# Patient Record
Sex: FEMALE | Race: WHITE | NOT HISPANIC OR LATINO | URBAN - METROPOLITAN AREA
[De-identification: names, ages, dates, MRNs, and addresses within clinical notes are randomized per-mention and may not be internally consistent; named-entity substitution may affect disease eponyms.]

---

## 2019-01-20 ENCOUNTER — HOSPITAL ENCOUNTER (EMERGENCY)
Facility: HOSPITAL | Age: 34
End: 2019-01-20
Attending: STUDENT IN AN ORGANIZED HEALTH CARE EDUCATION/TRAINING PROGRAM
Payer: MEDICARE

## 2019-01-20 VITALS
RESPIRATION RATE: 15 BRPM | BODY MASS INDEX: 20.83 KG/M2 | WEIGHT: 125 LBS | SYSTOLIC BLOOD PRESSURE: 119 MMHG | TEMPERATURE: 98.1 F | HEIGHT: 65 IN | DIASTOLIC BLOOD PRESSURE: 71 MMHG | OXYGEN SATURATION: 99 % | HEART RATE: 70 BPM

## 2019-01-20 DIAGNOSIS — T14.91XA SUICIDAL BEHAVIOR WITH ATTEMPTED SELF-INJURY (CMS/HCC): Primary | ICD-10-CM

## 2019-01-20 DIAGNOSIS — R45.851 SUICIDAL IDEATION: ICD-10-CM

## 2019-01-20 LAB
AMPHET UR QL SCN: NORMAL
ANION GAP SERPL CALC-SCNC: 8 MEQ/L (ref 3–15)
APAP SERPL-MCNC: <10 UG/ML (ref 10–30)
BARBITURATES UR QL SCN: NORMAL
BASOPHILS # BLD: 0.05 K/UL (ref 0.01–0.1)
BASOPHILS NFR BLD: 0.8 %
BENZODIAZ UR QL SCN: NORMAL
BUN SERPL-MCNC: 9 MG/DL (ref 8–20)
CALCIUM SERPL-MCNC: 8.4 MG/DL (ref 8.9–10.3)
CANNABINOIDS UR QL SCN: NORMAL
CHLORIDE SERPL-SCNC: 107 MEQ/L (ref 98–109)
CO2 SERPL-SCNC: 23 MEQ/L (ref 22–32)
COCAINE UR QL SCN: NORMAL
CREAT SERPL-MCNC: 0.8 MG/DL
DIFFERENTIAL METHOD BLD: ABNORMAL
EOSINOPHIL # BLD: 0.73 K/UL (ref 0.04–0.36)
EOSINOPHIL NFR BLD: 12 %
ERYTHROCYTE [DISTWIDTH] IN BLOOD BY AUTOMATED COUNT: 11.8 % (ref 11.7–14.4)
ETHANOL SERPL-MCNC: <5 MG/DL
GFR SERPL CREATININE-BSD FRML MDRD: >60 ML/MIN/1.73M*2
GLUCOSE SERPL-MCNC: 93 MG/DL (ref 70–99)
HCG UR QL: NEGATIVE
HCT VFR BLDCO AUTO: 41.3 %
HGB BLD-MCNC: 13.8 G/DL
IMM GRANULOCYTES # BLD AUTO: 0.01 K/UL (ref 0–0.08)
IMM GRANULOCYTES NFR BLD AUTO: 0.2 %
LYMPHOCYTES # BLD: 2.65 K/UL (ref 1.2–3.5)
LYMPHOCYTES NFR BLD: 43.4 %
MCH RBC QN AUTO: 32.6 PG (ref 28–33.2)
MCHC RBC AUTO-ENTMCNC: 33.4 G/DL (ref 32.2–35.5)
MCV RBC AUTO: 97.6 FL (ref 83–98)
MONOCYTES # BLD: 0.43 K/UL (ref 0.28–0.8)
MONOCYTES NFR BLD: 7 %
NEUTROPHILS # BLD: 2.23 K/UL (ref 1.7–7)
NEUTS SEG NFR BLD: 36.6 %
NRBC BLD-RTO: 0 %
OPIATES UR QL SCN: NORMAL
PCP UR QL SCN: NORMAL
PDW BLD AUTO: 9.4 FL (ref 9.4–12.3)
PLATELET # BLD AUTO: 249 K/UL
POTASSIUM SERPL-SCNC: 3.9 MEQ/L (ref 3.6–5.1)
RBC # BLD AUTO: 4.23 M/UL (ref 3.93–5.22)
SALICYLATES SERPL-MCNC: <4 MG/DL
SODIUM SERPL-SCNC: 138 MEQ/L (ref 136–144)
WBC # BLD AUTO: 6.1 K/UL

## 2019-01-20 PROCEDURE — 99285 EMERGENCY DEPT VISIT HI MDM: CPT

## 2019-01-20 PROCEDURE — 36415 COLL VENOUS BLD VENIPUNCTURE: CPT | Performed by: PHYSICIAN ASSISTANT

## 2019-01-20 PROCEDURE — 85025 COMPLETE CBC W/AUTO DIFF WBC: CPT | Performed by: PHYSICIAN ASSISTANT

## 2019-01-20 PROCEDURE — G0480 DRUG TEST DEF 1-7 CLASSES: HCPCS | Performed by: PHYSICIAN ASSISTANT

## 2019-01-20 PROCEDURE — 80307 DRUG TEST PRSMV CHEM ANLYZR: CPT | Performed by: PHYSICIAN ASSISTANT

## 2019-01-20 PROCEDURE — 84703 CHORIONIC GONADOTROPIN ASSAY: CPT | Performed by: PHYSICIAN ASSISTANT

## 2019-01-20 PROCEDURE — 84295 ASSAY OF SERUM SODIUM: CPT | Performed by: PHYSICIAN ASSISTANT

## 2019-01-20 RX ORDER — ALBUTEROL SULFATE 90 UG/1
AEROSOL, METERED RESPIRATORY (INHALATION)
Refills: 6 | COMMUNITY
Start: 2018-12-23

## 2019-01-20 RX ORDER — CLONAZEPAM 0.5 MG/1
0.5 TABLET ORAL AS NEEDED
COMMUNITY

## 2019-01-20 ASSESSMENT — ENCOUNTER SYMPTOMS
FEVER: 0
DIARRHEA: 0
WEAKNESS: 0
COUGH: 0
SHORTNESS OF BREATH: 0
VOMITING: 0
SORE THROAT: 0
DIZZINESS: 1
NUMBNESS: 0
COLOR CHANGE: 0
ABDOMINAL PAIN: 0

## 2019-01-20 ASSESSMENT — COGNITIVE AND FUNCTIONAL STATUS - GENERAL
PSYCHOMOTOR FUNCTIONING: INCREASED
APPEARANCE: WELL GROOMED
AFFECT: LABILE
SPEECH: PRESSURED;VERBOSE;LOUD
CURRENT VIOLENT THOUGHTS OR BEHAVIOR: ACTIVE IDEATION;PLAN;MEANS
PERCEPTUAL FUNCTION: NORMAL
IMPULSE CONTROL: SPONTANEOUS
THOUGHT_CONTENT: GUARDED;SUSPICIOUS
ORIENTATION: FULLY ORIENTED
JUDGEMENT: IMPAIRED, SEVERELY
INSIGHT: IMPAIRED SEVERELY

## 2019-01-20 NOTE — ED ATTESTATION NOTE
I saw and evaluated the patient, participated in the management, and agree with the findings in the above note. We discussed the case and the treatment plan.       Bertram Ibrahim,   01/20/19 0340

## 2019-01-20 NOTE — ED PROVIDER NOTES
"HPI     Chief Complaint   Patient presents with   • Drug Overdose     Pt reports suicidal thoughts \"for a long time\". Today reports taking 20 Klonopin in attempt to kill herself. Took 10 @ 3pm; 10 @ 11pm. c/o dizziness.        32 yo female pmhx asthma, bipolar disorder, borderline personality disorder, presents to ED for evaluation of suicidal attempt. Pt states that she took 10 tabs of 0.5mg Klonopin at 3pm today. Pt fell asleep, and then woke up and took more. Pt states that she took 10-12 additional tabs of 0.5mg klonopin at 11pm prior to arrival to ED.     Pt states she has not been taking any of her psychiatric medications x1 year    Pt presents accompanied by her .     Pt has hx of suicidal attempt, as recently as in august 2018, when she attempted to kill herself by placing a plastic bag over her head.    Pt admits to recent increase ETOH intake, 1 bottle of wine a day.    Pt denies any other illicit drug or substance use or abuse.        History provided by:  Patient  Drug Overdose   Associated symptoms: no abdominal pain, no chest pain, no cough, no diarrhea, no fever, no shortness of breath, no sore throat and no vomiting         Patient History     Past Medical History:   Diagnosis Date   • Asthma    • Bipolar disorder (CMS/HCC) ([facility])    • Borderline personality disorder (CMS/HCC)    • Seasonal allergies        History reviewed. No pertinent surgical history.    History reviewed. No pertinent family history.    Social History   Substance Use Topics   • Smoking status: Never Smoker   • Smokeless tobacco: Not on file   • Alcohol use Yes      Comment: daily; 1 bottle of wine daily.        Systems Reviewed from Nursing Triage:          Review of Systems     Review of Systems   Constitutional: Negative for fever.   HENT: Negative for sore throat.    Respiratory: Negative for cough and shortness of breath.    Cardiovascular: Negative for chest pain.   Gastrointestinal: Negative for abdominal pain, " "diarrhea and vomiting.   Skin: Negative for color change.   Neurological: Positive for dizziness. Negative for weakness and numbness.   Psychiatric/Behavioral: Positive for self-injury and suicidal ideas.        Physical Exam     ED Triage Vitals [01/20/19 0046]   Temp Heart Rate Resp BP SpO2   37.2 °C (98.9 °F) 90 16 120/87 100 %      Temp Source Heart Rate Source Patient Position BP Location FiO2 (%) (Set)   Tympanic Monitor -- -- --                     Patient Vitals for the past 24 hrs:   BP Temp Temp src Pulse Resp SpO2 Height Weight   01/20/19 0046 120/87 37.2 °C (98.9 °F) Tympanic 90 16 100 % 1.651 m (5' 5\") 56.7 kg (125 lb)           Physical Exam   Constitutional: She appears well-developed and well-nourished. No distress.   HENT:   Head: Normocephalic and atraumatic.   Eyes: Conjunctivae are normal.   Neck: Neck supple.   Cardiovascular: Normal rate and regular rhythm.    No murmur heard.  Pulmonary/Chest: Effort normal and breath sounds normal. No respiratory distress.   Abdominal: Soft. There is no tenderness.   Musculoskeletal: She exhibits no edema.   Neurological: She is alert.   Skin: Skin is warm and dry.   Psychiatric: Her speech is normal and behavior is normal. Thought content is not paranoid. She exhibits a depressed mood. She expresses suicidal ideation. She expresses suicidal plans. She expresses no homicidal plans.   Nursing note and vitals reviewed.           Procedures    ED Course & MDM     Labs Reviewed   CBC AND DIFFERENTIAL    Narrative:     The following orders were created for panel order CBC and differential.  Procedure                               Abnormality         Status                     ---------                               -----------         ------                     CBC[[id number]]                                                                          Diff Count[[id number]]                                                                     Please view results for these " tests on the individual orders.   ER TOXICOLOGY SCR, SERUM   DRUG SCREEN PANEL, URINE   BHCG, SERUM, QUAL   BASIC METABOLIC PANEL   CBC   DIFF COUNT       No orders to display               MDM         ED Course as of Jan 20 1634   Sun Jan 20, 2019   0116 Discussed with poison control, agrees with plan and work up. Recommending observation in ED for 4-6 hours.  [NC]   0201 Preg Test, Serum: Negative [NC]   0348 Pt remains stable. AAOx3, GCS15. Pt seen by Joselito saha. Plan for 201 psychiatric admission. Pt agrees with plan.   [NS]      ED Course User Index  [NC] Alcides Martin PA C  [NS] Bertram Ibrahim E, DO         Clinical Impressions as of Jan 20 1634   Suicidal behavior with attempted self-injury (CMS/HCC)   Suicidal ideation        Alcides Martin PA C  01/20/19 1948

## 2019-01-20 NOTE — ED NOTES
Patient requested not to be admitted to [facility]     Faxed clinical summary to [facility], and [facility] for admission consideration    Patient's SS # is      Awaiting official acceptance from [facility]

## 2019-01-20 NOTE — ED NOTES
Patient accepted to [facility]     Accepting physician is Dr sumit tamez     Ur contact is lauren Allen is contact at [facility]     CHANELL transport scheduled with [facility] for  is 2.5 hours

## 2019-01-20 NOTE — ED NOTES
AMBIKA received hand off from Joselito at 11am. AMBIKA confirmed with Tiffany at [facility] that the patient was accepted and SW provided ETA. SW met with patient to provide support and answer questions, SW had patient sign transfer form and 201 and provided patient with 201 rights and suicide resource sheet. [facility] came early to p/u patient, SW notified [facility]. Patient, MD, RN, AMBIKA and [facility] all aware/agreeable to plan.

## 2019-01-21 ENCOUNTER — TELEPHONE (OUTPATIENT)
Dept: INTERNAL MEDICINE | Facility: CLINIC | Age: 34
End: 2019-01-21

## 2019-01-21 NOTE — TELEPHONE ENCOUNTER
This patient was in the ED at [facility] on 1/20/19 for attempted suicide. This patient was seen by you one time as a new patient on 1/23/18.  Do you want this patient called?

## 2021-07-07 ENCOUNTER — APPOINTMENT (EMERGENCY)
Dept: CT IMAGING | Facility: HOSPITAL | Age: 36
DRG: 603 | End: 2021-07-07
Payer: MEDICARE

## 2021-07-07 ENCOUNTER — HOSPITAL ENCOUNTER (INPATIENT)
Facility: HOSPITAL | Age: 36
LOS: 2 days | Discharge: HOME | DRG: 603 | End: 2021-07-10
Attending: EMERGENCY MEDICINE | Admitting: INTERNAL MEDICINE
Payer: MEDICARE

## 2021-07-07 DIAGNOSIS — L03.211 FACIAL CELLULITIS: Primary | ICD-10-CM

## 2021-07-07 LAB
ANION GAP SERPL CALCULATED.3IONS-SCNC: 11 MMOL/L (ref 4–13)
BASOPHILS # BLD AUTO: 0.02 THOUSANDS/ΜL (ref 0–0.1)
BASOPHILS NFR BLD AUTO: 0 % (ref 0–1)
BUN SERPL-MCNC: 10 MG/DL (ref 5–25)
CALCIUM SERPL-MCNC: 8.1 MG/DL (ref 8.3–10.1)
CHLORIDE SERPL-SCNC: 101 MMOL/L (ref 100–108)
CO2 SERPL-SCNC: 24 MMOL/L (ref 21–32)
CREAT SERPL-MCNC: 0.81 MG/DL (ref 0.6–1.3)
EOSINOPHIL # BLD AUTO: 0.26 THOUSAND/ΜL (ref 0–0.61)
EOSINOPHIL NFR BLD AUTO: 2 % (ref 0–6)
ERYTHROCYTE [DISTWIDTH] IN BLOOD BY AUTOMATED COUNT: 11.7 % (ref 11.6–15.1)
EXT PREG TEST URINE: NEGATIVE
EXT. CONTROL ED NAV: NORMAL
GFR SERPL CREATININE-BSD FRML MDRD: 93 ML/MIN/1.73SQ M
GLUCOSE SERPL-MCNC: 113 MG/DL (ref 65–140)
HCT VFR BLD AUTO: 37.9 % (ref 34.8–46.1)
HGB BLD-MCNC: 12.8 G/DL (ref 11.5–15.4)
IMM GRANULOCYTES # BLD AUTO: 0.03 THOUSAND/UL (ref 0–0.2)
IMM GRANULOCYTES NFR BLD AUTO: 0 % (ref 0–2)
LYMPHOCYTES # BLD AUTO: 1.52 THOUSANDS/ΜL (ref 0.6–4.47)
LYMPHOCYTES NFR BLD AUTO: 9 % (ref 14–44)
MCH RBC QN AUTO: 32.2 PG (ref 26.8–34.3)
MCHC RBC AUTO-ENTMCNC: 33.8 G/DL (ref 31.4–37.4)
MCV RBC AUTO: 96 FL (ref 82–98)
MONOCYTES # BLD AUTO: 1.24 THOUSAND/ΜL (ref 0.17–1.22)
MONOCYTES NFR BLD AUTO: 8 % (ref 4–12)
NEUTROPHILS # BLD AUTO: 13.03 THOUSANDS/ΜL (ref 1.85–7.62)
NEUTS SEG NFR BLD AUTO: 81 % (ref 43–75)
PLATELET # BLD AUTO: 253 THOUSANDS/UL (ref 149–390)
PMV BLD AUTO: 9.1 FL (ref 8.9–12.7)
POTASSIUM SERPL-SCNC: 3.6 MMOL/L (ref 3.5–5.3)
RBC # BLD AUTO: 3.97 MILLION/UL (ref 3.81–5.12)
SODIUM SERPL-SCNC: 136 MMOL/L (ref 136–145)
WBC # BLD AUTO: 16.1 THOUSAND/UL (ref 4.31–10.16)

## 2021-07-07 PROCEDURE — 99285 EMERGENCY DEPT VISIT HI MDM: CPT

## 2021-07-07 PROCEDURE — 85025 COMPLETE CBC W/AUTO DIFF WBC: CPT | Performed by: EMERGENCY MEDICINE

## 2021-07-07 PROCEDURE — 96375 TX/PRO/DX INJ NEW DRUG ADDON: CPT

## 2021-07-07 PROCEDURE — 36415 COLL VENOUS BLD VENIPUNCTURE: CPT | Performed by: EMERGENCY MEDICINE

## 2021-07-07 PROCEDURE — G1004 CDSM NDSC: HCPCS

## 2021-07-07 PROCEDURE — 80048 BASIC METABOLIC PNL TOTAL CA: CPT | Performed by: EMERGENCY MEDICINE

## 2021-07-07 PROCEDURE — 81025 URINE PREGNANCY TEST: CPT | Performed by: EMERGENCY MEDICINE

## 2021-07-07 PROCEDURE — 99285 EMERGENCY DEPT VISIT HI MDM: CPT | Performed by: EMERGENCY MEDICINE

## 2021-07-07 PROCEDURE — 96374 THER/PROPH/DIAG INJ IV PUSH: CPT

## 2021-07-07 PROCEDURE — 96361 HYDRATE IV INFUSION ADD-ON: CPT

## 2021-07-07 PROCEDURE — 70487 CT MAXILLOFACIAL W/DYE: CPT

## 2021-07-07 RX ORDER — METHYLPREDNISOLONE SODIUM SUCCINATE 125 MG/2ML
60 INJECTION, POWDER, LYOPHILIZED, FOR SOLUTION INTRAMUSCULAR; INTRAVENOUS ONCE
Status: COMPLETED | OUTPATIENT
Start: 2021-07-07 | End: 2021-07-07

## 2021-07-07 RX ORDER — KETOROLAC TROMETHAMINE 30 MG/ML
30 INJECTION, SOLUTION INTRAMUSCULAR; INTRAVENOUS ONCE
Status: COMPLETED | OUTPATIENT
Start: 2021-07-07 | End: 2021-07-07

## 2021-07-07 RX ADMIN — SODIUM CHLORIDE 1000 ML: 0.9 INJECTION, SOLUTION INTRAVENOUS at 23:21

## 2021-07-07 RX ADMIN — IOHEXOL 100 ML: 350 INJECTION, SOLUTION INTRAVENOUS at 23:41

## 2021-07-07 RX ADMIN — KETOROLAC TROMETHAMINE 30 MG: 30 INJECTION, SOLUTION INTRAMUSCULAR; INTRAVENOUS at 23:20

## 2021-07-07 RX ADMIN — METHYLPREDNISOLONE SODIUM SUCCINATE 60 MG: 125 INJECTION, POWDER, FOR SOLUTION INTRAMUSCULAR; INTRAVENOUS at 23:18

## 2021-07-08 PROBLEM — L03.211 FACIAL CELLULITIS: Status: ACTIVE | Noted: 2021-07-08

## 2021-07-08 PROBLEM — F41.9 ANXIETY: Status: ACTIVE | Noted: 2021-07-08

## 2021-07-08 PROBLEM — J45.20 MILD INTERMITTENT ASTHMA WITHOUT COMPLICATION: Status: ACTIVE | Noted: 2021-07-08

## 2021-07-08 LAB
ANION GAP SERPL CALCULATED.3IONS-SCNC: 9 MMOL/L (ref 4–13)
BUN SERPL-MCNC: 5 MG/DL (ref 5–25)
CALCIUM SERPL-MCNC: 8 MG/DL (ref 8.3–10.1)
CHLORIDE SERPL-SCNC: 110 MMOL/L (ref 100–108)
CO2 SERPL-SCNC: 22 MMOL/L (ref 21–32)
CREAT SERPL-MCNC: 0.73 MG/DL (ref 0.6–1.3)
ERYTHROCYTE [DISTWIDTH] IN BLOOD BY AUTOMATED COUNT: 11.8 % (ref 11.6–15.1)
GFR SERPL CREATININE-BSD FRML MDRD: 106 ML/MIN/1.73SQ M
GLUCOSE SERPL-MCNC: 150 MG/DL (ref 65–140)
HCT VFR BLD AUTO: 38.8 % (ref 34.8–46.1)
HGB BLD-MCNC: 13 G/DL (ref 11.5–15.4)
MCH RBC QN AUTO: 32.3 PG (ref 26.8–34.3)
MCHC RBC AUTO-ENTMCNC: 33.5 G/DL (ref 31.4–37.4)
MCV RBC AUTO: 96 FL (ref 82–98)
PLATELET # BLD AUTO: 250 THOUSANDS/UL (ref 149–390)
PMV BLD AUTO: 9.3 FL (ref 8.9–12.7)
POTASSIUM SERPL-SCNC: 4 MMOL/L (ref 3.5–5.3)
RBC # BLD AUTO: 4.03 MILLION/UL (ref 3.81–5.12)
SODIUM SERPL-SCNC: 141 MMOL/L (ref 136–145)
WBC # BLD AUTO: 13.64 THOUSAND/UL (ref 4.31–10.16)

## 2021-07-08 PROCEDURE — 80048 BASIC METABOLIC PNL TOTAL CA: CPT | Performed by: PHYSICIAN ASSISTANT

## 2021-07-08 PROCEDURE — 99223 1ST HOSP IP/OBS HIGH 75: CPT | Performed by: INTERNAL MEDICINE

## 2021-07-08 PROCEDURE — 85027 COMPLETE CBC AUTOMATED: CPT | Performed by: PHYSICIAN ASSISTANT

## 2021-07-08 PROCEDURE — 36415 COLL VENOUS BLD VENIPUNCTURE: CPT | Performed by: PHYSICIAN ASSISTANT

## 2021-07-08 RX ORDER — LORAZEPAM 0.5 MG/1
0.5 TABLET ORAL ONCE
Status: COMPLETED | OUTPATIENT
Start: 2021-07-08 | End: 2021-07-08

## 2021-07-08 RX ORDER — DIPHENHYDRAMINE HCL 25 MG
25 TABLET ORAL EVERY 6 HOURS PRN
Status: DISCONTINUED | OUTPATIENT
Start: 2021-07-08 | End: 2021-07-10 | Stop reason: HOSPADM

## 2021-07-08 RX ORDER — ALBUTEROL SULFATE 90 UG/1
2 AEROSOL, METERED RESPIRATORY (INHALATION) EVERY 6 HOURS PRN
COMMUNITY

## 2021-07-08 RX ORDER — CALCIUM CARBONATE 200(500)MG
1000 TABLET,CHEWABLE ORAL DAILY PRN
Status: DISCONTINUED | OUTPATIENT
Start: 2021-07-08 | End: 2021-07-10 | Stop reason: HOSPADM

## 2021-07-08 RX ORDER — SODIUM CHLORIDE 9 MG/ML
75 INJECTION, SOLUTION INTRAVENOUS CONTINUOUS
Status: DISCONTINUED | OUTPATIENT
Start: 2021-07-08 | End: 2021-07-10 | Stop reason: HOSPADM

## 2021-07-08 RX ORDER — CETIRIZINE HYDROCHLORIDE, PSEUDOEPHEDRINE HYDROCHLORIDE 5; 120 MG/1; MG/1
1 TABLET, FILM COATED, EXTENDED RELEASE ORAL 2 TIMES DAILY
COMMUNITY

## 2021-07-08 RX ORDER — ONDANSETRON 2 MG/ML
4 INJECTION INTRAMUSCULAR; INTRAVENOUS EVERY 6 HOURS PRN
Status: DISCONTINUED | OUTPATIENT
Start: 2021-07-08 | End: 2021-07-10 | Stop reason: HOSPADM

## 2021-07-08 RX ORDER — NORETHINDRONE ACETATE AND ETHINYL ESTRADIOL AND FERROUS FUMARATE 1MG-20(24)
1 KIT ORAL DAILY
COMMUNITY

## 2021-07-08 RX ORDER — CEFAZOLIN SODIUM 1 G/50ML
1000 SOLUTION INTRAVENOUS ONCE
Status: COMPLETED | OUTPATIENT
Start: 2021-07-08 | End: 2021-07-08

## 2021-07-08 RX ORDER — ALBUTEROL SULFATE 90 UG/1
2 AEROSOL, METERED RESPIRATORY (INHALATION) EVERY 6 HOURS PRN
Status: DISCONTINUED | OUTPATIENT
Start: 2021-07-08 | End: 2021-07-10 | Stop reason: HOSPADM

## 2021-07-08 RX ORDER — ACETAMINOPHEN 325 MG/1
650 TABLET ORAL EVERY 6 HOURS PRN
Status: DISCONTINUED | OUTPATIENT
Start: 2021-07-08 | End: 2021-07-10 | Stop reason: HOSPADM

## 2021-07-08 RX ORDER — FLUTICASONE FUROATE AND VILANTEROL 200; 25 UG/1; UG/1
1 POWDER RESPIRATORY (INHALATION)
Status: DISCONTINUED | OUTPATIENT
Start: 2021-07-08 | End: 2021-07-10 | Stop reason: HOSPADM

## 2021-07-08 RX ORDER — HYDROXYZINE PAMOATE 25 MG/1
25 CAPSULE ORAL 3 TIMES DAILY PRN
COMMUNITY

## 2021-07-08 RX ORDER — SENNOSIDES 8.6 MG
1 TABLET ORAL
Status: DISCONTINUED | OUTPATIENT
Start: 2021-07-08 | End: 2021-07-10 | Stop reason: HOSPADM

## 2021-07-08 RX ORDER — DIPHENHYDRAMINE HCL 25 MG
12.5 TABLET ORAL EVERY 6 HOURS PRN
Status: DISCONTINUED | OUTPATIENT
Start: 2021-07-08 | End: 2021-07-08

## 2021-07-08 RX ORDER — HYDROXYZINE HYDROCHLORIDE 25 MG/1
25 TABLET, FILM COATED ORAL EVERY 6 HOURS PRN
Status: DISCONTINUED | OUTPATIENT
Start: 2021-07-08 | End: 2021-07-09

## 2021-07-08 RX ORDER — KETOROLAC TROMETHAMINE 30 MG/ML
15 INJECTION, SOLUTION INTRAMUSCULAR; INTRAVENOUS EVERY 6 HOURS PRN
Status: DISPENSED | OUTPATIENT
Start: 2021-07-08 | End: 2021-07-10

## 2021-07-08 RX ORDER — DIPHENHYDRAMINE HCL 25 MG
12.5 TABLET ORAL
Status: DISCONTINUED | OUTPATIENT
Start: 2021-07-08 | End: 2021-07-08

## 2021-07-08 RX ADMIN — KETOROLAC TROMETHAMINE 15 MG: 30 INJECTION, SOLUTION INTRAMUSCULAR; INTRAVENOUS at 13:51

## 2021-07-08 RX ADMIN — ACETAMINOPHEN 650 MG: 325 TABLET, FILM COATED ORAL at 13:51

## 2021-07-08 RX ADMIN — AMPICILLIN SODIUM AND SULBACTAM SODIUM 3 G: 2; 1 INJECTION, POWDER, FOR SOLUTION INTRAMUSCULAR; INTRAVENOUS at 14:46

## 2021-07-08 RX ADMIN — SODIUM CHLORIDE 75 ML/HR: 0.9 INJECTION, SOLUTION INTRAVENOUS at 13:50

## 2021-07-08 RX ADMIN — ACETAMINOPHEN 650 MG: 325 TABLET, FILM COATED ORAL at 21:39

## 2021-07-08 RX ADMIN — LORAZEPAM 0.5 MG: 0.5 TABLET ORAL at 22:12

## 2021-07-08 RX ADMIN — AMPICILLIN SODIUM AND SULBACTAM SODIUM 3 G: 2; 1 INJECTION, POWDER, FOR SOLUTION INTRAMUSCULAR; INTRAVENOUS at 09:50

## 2021-07-08 RX ADMIN — VANCOMYCIN HYDROCHLORIDE 750 MG: 750 INJECTION, SOLUTION INTRAVENOUS at 23:25

## 2021-07-08 RX ADMIN — CEFAZOLIN SODIUM 1000 MG: 1 SOLUTION INTRAVENOUS at 01:11

## 2021-07-08 RX ADMIN — DIPHENHYDRAMINE HYDROCHLORIDE 25 MG: 25 TABLET ORAL at 17:18

## 2021-07-08 RX ADMIN — AMPICILLIN SODIUM AND SULBACTAM SODIUM 3 G: 2; 1 INJECTION, POWDER, FOR SOLUTION INTRAMUSCULAR; INTRAVENOUS at 03:32

## 2021-07-08 RX ADMIN — FLUTICASONE FUROATE AND VILANTEROL TRIFENATATE 1 PUFF: 200; 25 POWDER RESPIRATORY (INHALATION) at 14:46

## 2021-07-08 RX ADMIN — AMPICILLIN SODIUM AND SULBACTAM SODIUM 3 G: 2; 1 INJECTION, POWDER, FOR SOLUTION INTRAMUSCULAR; INTRAVENOUS at 21:29

## 2021-07-08 RX ADMIN — DIPHENHYDRAMINE HYDROCHLORIDE 12.5 MG: 25 TABLET ORAL at 03:32

## 2021-07-08 NOTE — ASSESSMENT & PLAN NOTE
· Home regimen: Ventolin p r n    · Will continue home regimen   · No signs of acute exacerbation on admission

## 2021-07-08 NOTE — ASSESSMENT & PLAN NOTE
· Patient reports she is described Vistaril 25 mg p r n , however she has not used it  · Will prescribe Vistaril here

## 2021-07-08 NOTE — ASSESSMENT & PLAN NOTE
· Woke up at 0600 on 07/07 with pain and swelling of her right cheek that progressively worsened with developing erythema  · seen at urgent care and started on amoxicillin, however she developed a low-grade temperature so she presented to the ED - 1 dose of amoxicillin taken  · CT facial bones: "Extensive right facial cellulitis overlying the mandible and extending into the submandibular space, involving the right masseter and platysma muscles  No evidence of soft tissue abscess  Inflammation of the right parotid gland and to a lesser extent the right submandibular gland, likely secondary to surrounding cellulitis rather than representing primary sialoadenitis  Possible small periapical abscess in the right mandibular 2nd molar suggestive of a possible odontogenic source of infection  No evidence of subperiosteal abscess   Paranasal sinus disease "  · Leukocytosis 16 10 K on admission  · Received Ancef in the ED, will change to Unasyn due to concern for possible odontogenic etiology  · Continue Unasyn  · Patient without any dental pain, tenderness, or dental pain prior to symptom onset  · Erythematous boundary marked on admission

## 2021-07-08 NOTE — H&P
[place]  H&P- Annabelle Griffith 1985, 38 y o  female MRN: [id number]  Unit/Bed#: ED 04 Encounter: [id number]  Primary Care Provider: No primary care provider on file  Date and time admitted to hospital: 7/7/2021 10:37 PM    * Facial cellulitis  Assessment & Plan  · Woke up at 0600 on 07/07 with pain and swelling of her right cheek that progressively worsened with developing erythema  · seen at urgent care and started on amoxicillin, however she developed a low-grade temperature so she presented to the ED - 1 dose of amoxicillin taken  · CT facial bones: "Extensive right facial cellulitis overlying the mandible and extending into the submandibular space, involving the right masseter and platysma muscles  No evidence of soft tissue abscess  Inflammation of the right parotid gland and to a lesser extent the right submandibular gland, likely secondary to surrounding cellulitis rather than representing primary sialoadenitis  Possible small periapical abscess in the right mandibular 2nd molar suggestive of a possible odontogenic source of infection  No evidence of subperiosteal abscess  Paranasal sinus disease "  · Leukocytosis 16 10 K on admission  · Received Ancef in the ED, will change to Unasyn due to concern for possible odontogenic etiology  · Continue Unasyn  · Patient without any dental pain, tenderness, or dental pain prior to symptom onset  · Erythematous boundary marked on admission    Anxiety  Assessment & Plan  · Patient reports she is described Vistaril 25 mg p r n , however she has not used it  · Will prescribe Vistaril here    Mild intermittent asthma without complication  Assessment & Plan  · Home regimen: Ventolin p r n  · Will continue home regimen   · No signs of acute exacerbation on admission    VTE Pharmacologic Prophylaxis: VTE Score: 1 Low Risk (Score 0-2) - Encourage Ambulation    Code Status: Level 1 - Full Code   Discussion with family: Patient declined call to   Anticipated Length of Stay: Patient will be admitted on an inpatient basis with an anticipated length of stay of greater than 2 midnights secondary to Extensive right facial cellulitis  Total Time for Visit, including Counseling / Coordination of Care: 60 minutes Greater than 50% of this total time spent on direct patient counseling and coordination of care  Chief Complaint:  "Worsening pain and swelling in the right side of my face"    History of Present Illness:  Linus Butler is a 38 y o  female with a PMH of Sialolithiasis, anxiety, and asthma who presents with pain and swelling of her right cheek since awakening at 6:00 a m     The pain and swelling worsened throughout the day and then she developed erythema  She presented to urgent care who started her on amoxicillin  The erythema spread and she developed a low-grade fever, so she presented to the ED  She took 1 dose of the amoxicillin thus far  Patient reports that the erythema originally started on her cheek but then spread to her ear and down her neck  No recent injury to the face, no recent comedones  No dental pain  She tried sour lozenges without any change  She was in her normal state of health on Tuesday  Review of Systems:  Review of Systems   Constitutional: Positive for chills and fever  HENT: Positive for facial swelling  Negative for congestion  Respiratory: Negative for cough and shortness of breath  Cardiovascular: Negative for chest pain, palpitations and leg swelling  Gastrointestinal: Negative for abdominal pain, diarrhea, nausea and vomiting  Genitourinary: Negative for dysuria  Musculoskeletal: Negative for gait problem  Neurological: Negative for weakness and numbness  All other systems reviewed and are negative  Past Medical and Surgical History:   Past Medical History:   Diagnosis Date    Asthma        History reviewed   No pertinent surgical history  Meds/Allergies:  Prior to Admission medications    Medication Sig Start Date End Date Taking? Authorizing Provider   albuterol (PROVENTIL HFA,VENTOLIN HFA) 90 mcg/act inhaler Inhale 2 puffs every 6 (six) hours as needed for wheezing   Yes Historical Provider, MD   cetirizine-pseudoephedrine (ZyrTEC-D) 5-120 MG per tablet Take 1 tablet by mouth 2 (two) times a day   Yes Historical Provider, MD   norethindrone-ethinyl estradiol-ferrous fumarate (LOESTIN 24 FE) 1-20 MG-MCG(24) per tablet Take 1 tablet by mouth daily   Yes Historical Provider, MD   hydrOXYzine pamoate (VISTARIL) 25 mg capsule Take 25 mg by mouth 3 (three) times a day as needed for itching    Historical Provider, MD     I have reviewed home medications with patient personally  Allergies: No Known Allergies    Social History:  Marital Status: /Civil Union   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 [employer]   Patient Pre-hospital Living Situation: Home, With spouse  Patient Pre-hospital Level of Mobility: walks  Patient Pre-hospital Diet Restrictions: none   Substance Use History:   Social History     Substance and Sexual Activity   Alcohol Use Yes     Social History     Tobacco Use   Smoking Status Never Smoker   Smokeless Tobacco Never Used     Social History     Substance and Sexual Activity   Drug Use Not on file       Family History:  History reviewed  No pertinent family history  Physical Exam:     Vitals:   Blood Pressure: 107/57 (07/08/21 0446)  Pulse: 79 (07/08/21 0446)  Temperature: 98 9 °F (37 2 °C) (07/07/21 2238)  Temp Source: Oral (07/07/21 2238)  Respirations: 16 (07/08/21 0446)  Height: 5' 5" (165 1 cm) (07/07/21 2238)  Weight - Scale: 59 kg (130 lb) (07/07/21 2238)  SpO2: 98 % (07/08/21 0446)    Physical Exam  Vitals and nursing note reviewed  Constitutional:       Appearance: Normal appearance  HENT:      Head: Normocephalic  Nose: Nose normal       Mouth/Throat:      Comments: No obvious dental abscess    Trismus present  Significant swelling of the right cheek extending to the anterolateral right side of her neck  Significant swelling and firmness of the right cheek  No palpable salivary gland stone  Parotid gland enlarged  No visualized stone intraorally  Erythematous boundary marked  Eyes:      Extraocular Movements: Extraocular movements intact  Neck:      Comments: Erythema of the right anterior and lateral neck  Cardiovascular:      Rate and Rhythm: Normal rate and regular rhythm  Pulses: Normal pulses  Heart sounds: No murmur heard  Pulmonary:      Effort: Pulmonary effort is normal  No respiratory distress  Breath sounds: Normal breath sounds  No wheezing, rhonchi or rales  Abdominal:      General: Abdomen is flat  Palpations: Abdomen is soft  Musculoskeletal:         General: Normal range of motion  Cervical back: Normal range of motion  Right lower leg: No edema  Left lower leg: No edema  Skin:     General: Skin is warm and dry  Coloration: Skin is not pale  Neurological:      General: No focal deficit present  Mental Status: She is alert and oriented to person, place, and time  Psychiatric:         Mood and Affect: Mood normal          Thought Content:  Thought content normal           Additional Data:     Lab Results:  Results from last 7 days   Lab Units 07/08/21 0446 07/07/21  2315   WBC Thousand/uL 13 64* 16 10*   HEMOGLOBIN g/dL 13 0 12 8   HEMATOCRIT % 38 8 37 9   PLATELETS Thousands/uL 250 253   NEUTROS PCT %  --  81*   LYMPHS PCT %  --  9*   MONOS PCT %  --  8   EOS PCT %  --  2     Results from last 7 days   Lab Units 07/08/21  0446   SODIUM mmol/L 141   POTASSIUM mmol/L 4 0   CHLORIDE mmol/L 110*   CO2 mmol/L 22   BUN mg/dL 5   CREATININE mg/dL 0 73   ANION GAP mmol/L 9   CALCIUM mg/dL 8 0*   GLUCOSE RANDOM mg/dL 150*                       Imaging: Reviewed radiology reports from this admission including: CT facial bones  CT facial bones w contrast   Final Result by Benji Aj MD (07/08 0041)         1  Extensive right facial cellulitis overlying the mandible and extending into the submandibular space, involving the right masseter and platysma muscles  No evidence of soft tissue abscess  2   Inflammation of the right parotid gland and to a lesser extent the right submandibular gland, likely secondary to surrounding cellulitis rather than representing primary sialoadenitis  3   Possible small periapical abscess in the right mandibular 2nd molar suggestive of a possible odontogenic source of infection  No evidence of subperiosteal abscess  4   Paranasal sinus disease  Workstation performed: [id number]             EKG and Other Studies Reviewed on Admission:   · EKG: No EKG obtained  ** Please Note: This note has been constructed using a voice recognition system   **

## 2021-07-08 NOTE — ED PROVIDER NOTES
History  Chief Complaint   Patient presents with    Facial Swelling     Pt c/o of saliva gland infection, was seen at [facility] care and took her first dose of amoxcillin 3 hours ago  39 yo F with PMH of salivary gland stone, presents to ED with increased right sided facial swelling and pain  Started this morning  No trauma  Started similar to past stone, but then got very painful even after trying sour candies, so went to urgent care  She was started on amoxicillin, recommended to come to er if sx worsened  Did get a dose of abx about 3 hours ago  Swelling/pain has worsened  No fevers  No difficulty swallowing/breathing/talking - but the pain/redness/swelling has started to track down her neck so she came in  No dental/ear/eye pain  History provided by:  Patient and medical records   used: No    Medical Problem  Severity:  Moderate  Onset quality:  Gradual  Duration:  1 day  Timing:  Constant  Progression:  Worsening  Chronicity:  Recurrent  Associated symptoms: no abdominal pain, no chest pain, no congestion, no cough, no diarrhea, no ear pain, no fatigue, no fever, no headaches, no loss of consciousness, no myalgias, no nausea, no rash, no rhinorrhea, no shortness of breath, no sore throat, no vomiting and no wheezing        None       Past Medical History:   Diagnosis Date    Asthma        History reviewed  No pertinent surgical history  History reviewed  No pertinent family history  I have reviewed and agree with the history as documented  E-Cigarette/Vaping     E-Cigarette/Vaping Substances     Social History     Tobacco Use    Smoking status: Never Smoker    Smokeless tobacco: Never Used   Substance Use Topics    Alcohol use: Yes    Drug use: Not on file       Review of Systems   Constitutional: Negative for appetite change, chills, fatigue and fever  HENT: Positive for facial swelling   Negative for congestion, dental problem, drooling, ear discharge, ear pain, mouth sores, rhinorrhea, sore throat, trouble swallowing and voice change  Eyes: Negative for pain and visual disturbance  Respiratory: Negative for cough, chest tightness, shortness of breath and wheezing  Cardiovascular: Negative for chest pain, palpitations and leg swelling  Gastrointestinal: Negative for abdominal pain, blood in stool, constipation, diarrhea, nausea and vomiting  Genitourinary: Negative for difficulty urinating and hematuria  Musculoskeletal: Negative for back pain, myalgias and neck stiffness  Skin: Negative for rash  Neurological: Negative for dizziness, loss of consciousness, syncope, speech difficulty, light-headedness and headaches  Psychiatric/Behavioral: Negative for confusion and suicidal ideas  Physical Exam  Physical Exam  Vitals and nursing note reviewed  Constitutional:       General: She is not in acute distress  Appearance: She is well-developed  She is not diaphoretic  HENT:      Head: Normocephalic and atraumatic  Jaw: Trismus, tenderness, swelling and pain on movement present  Salivary Glands: Right salivary gland is diffusely enlarged and tender  Right Ear: Tympanic membrane and external ear normal  No mastoid tenderness  Left Ear: Tympanic membrane and external ear normal  No mastoid tenderness  Nose: Nose normal       Mouth/Throat:      Mouth: Mucous membranes are moist       Dentition: Normal dentition  No gingival swelling or dental abscesses  Tongue: No lesions  Tongue does not deviate from midline  Pharynx: Oropharynx is clear  No pharyngeal swelling, oropharyngeal exudate, posterior oropharyngeal erythema or uvula swelling  Comments: No stone palpated  No ludwigs  No dental tenderness  Posterior oropharynx clear  Eyes:      General: No scleral icterus  Right eye: No discharge  Left eye: No discharge  Extraocular Movements: Extraocular movements intact        Conjunctiva/sclera: Conjunctivae normal       Pupils: Pupils are equal, round, and reactive to light  Neck:      Trachea: No tracheal deviation  Cardiovascular:      Rate and Rhythm: Normal rate and regular rhythm  Pulses: Normal pulses  Heart sounds: Normal heart sounds  No murmur heard  No friction rub  No gallop  Pulmonary:      Effort: Pulmonary effort is normal  No respiratory distress  Breath sounds: Normal breath sounds  No stridor  Chest:      Chest wall: No tenderness  Abdominal:      General: Bowel sounds are normal       Palpations: Abdomen is soft  Tenderness: There is no abdominal tenderness  There is no guarding or rebound  Musculoskeletal:         General: No deformity  Normal range of motion  Cervical back: Normal range of motion and neck supple  No rigidity  Lymphadenopathy:      Cervical: No cervical adenopathy  Skin:     General: Skin is warm and dry  Findings: No rash  Neurological:      Mental Status: She is alert and oriented to person, place, and time  Cranial Nerves: No cranial nerve deficit  Sensory: No sensory deficit        Coordination: Coordination normal    Psychiatric:         Behavior: Behavior normal          Vital Signs  ED Triage Vitals [07/07/21 2238]   Temperature Pulse Respirations Blood Pressure SpO2   98 9 °F (37 2 °C) 99 18 134/66 99 %      Temp Source Heart Rate Source Patient Position - Orthostatic VS BP Location FiO2 (%)   Oral Monitor Sitting Right arm --      Pain Score       5           Vitals:    07/07/21 2238 07/07/21 2300 07/08/21 0000 07/08/21 0030   BP: 134/66 125/70 112/61 111/63   Pulse: 99 102 94 88   Patient Position - Orthostatic VS: Sitting Lying Lying Lying         Visual Acuity      ED Medications  Medications   ceFAZolin (ANCEF) IVPB (premix in dextrose) 1,000 mg 50 mL (has no administration in time range)   sodium chloride 0 9 % bolus 1,000 mL (0 mL Intravenous Stopped 7/8/21 0021)   ketorolac (TORADOL) injection 30 mg (30 mg Intravenous Given 7/7/21 2320)   methylPREDNISolone sodium succinate (Solu-MEDROL) injection 60 mg (60 mg Intravenous Given 7/7/21 2318)   iohexol (OMNIPAQUE) 350 MG/ML injection (SINGLE-DOSE) 100 mL (100 mL Intravenous Given 7/7/21 2341)       Diagnostic Studies  Results Reviewed     Procedure Component Value Units Date/Time    Basic metabolic panel [[id number]]  (Abnormal) Collected: 07/07/21 2315    Lab Status: Final result Specimen: Blood from Arm, Right Updated: 07/07/21 2333     Sodium 136 mmol/L      Potassium 3 6 mmol/L      Chloride 101 mmol/L      CO2 24 mmol/L      ANION GAP 11 mmol/L      BUN 10 mg/dL      Creatinine 0 81 mg/dL      Glucose 113 mg/dL      Calcium 8 1 mg/dL      eGFR 93 ml/min/1 73sq m     Narrative:      [place] guidelines for Chronic Kidney Disease (CKD):     Stage 1 with normal or high GFR (GFR > 90 mL/min/1 73 square meters)    Stage 2 Mild CKD (GFR = 60-89 mL/min/1 73 square meters)    Stage 3A Moderate CKD (GFR = 45-59 mL/min/1 73 square meters)    Stage 3B Moderate CKD (GFR = 30-44 mL/min/1 73 square meters)    Stage 4 Severe CKD (GFR = 15-29 mL/min/1 73 square meters)    Stage 5 End Stage CKD (GFR <15 mL/min/1 73 square meters)  Note: GFR calculation is accurate only with a steady state creatinine    POCT pregnancy, urine [[id number]]  (Normal) Resulted: 07/07/21 2329    Lab Status: Final result Updated: 07/07/21 2329     EXT PREG TEST UR (Ref: Negative) Negative     Control Valid    CBC and differential [[id number]]  (Abnormal) Collected: 07/07/21 2315    Lab Status: Final result Specimen: Blood from Arm, Right Updated: 07/07/21 2321     WBC 16 10 Thousand/uL      RBC 3 97 Million/uL      Hemoglobin 12 8 g/dL      Hematocrit 37 9 %      MCV 96 fL      MCH 32 2 pg      MCHC 33 8 g/dL      RDW 11 7 %      MPV 9 1 fL      Platelets 873 Thousands/uL      Neutrophils Relative 81 %      Immat GRANS % 0 %      Lymphocytes Relative 9 % Monocytes Relative 8 %      Eosinophils Relative 2 %      Basophils Relative 0 %      Neutrophils Absolute 13 03 Thousands/µL      Immature Grans Absolute 0 03 Thousand/uL      Lymphocytes Absolute 1 52 Thousands/µL      Monocytes Absolute 1 24 Thousand/µL      Eosinophils Absolute 0 26 Thousand/µL      Basophils Absolute 0 02 Thousands/µL                  CT facial bones w contrast   Final Result by Clarice Saab MD (07/08 0041)         1  Extensive right facial cellulitis overlying the mandible and extending into the submandibular space, involving the right masseter and platysma muscles  No evidence of soft tissue abscess  2   Inflammation of the right parotid gland and to a lesser extent the right submandibular gland, likely secondary to surrounding cellulitis rather than representing primary sialoadenitis  3   Possible small periapical abscess in the right mandibular 2nd molar suggestive of a possible odontogenic source of infection  No evidence of subperiosteal abscess  4   Paranasal sinus disease  Workstation performed: [id number]                    Procedures  Procedures         ED Course  ED Course as of Jul 08 0103   Thu Jul 08, 2021   0102 CT noted  Will start abx  Pt has no dental pain, no dental tenderness to touch on exam  Will admit here  SBIRT 22yo+      Most Recent Value   SBIRT (22 yo +)   In order to provide better care to our patients, we are screening all of our patients for alcohol and drug use  Would it be okay to ask you these screening questions? Yes Filed at: 07/07/2021 2328   Initial Alcohol Screen: US AUDIT-C    1  How often do you have a drink containing alcohol? 3 Filed at: 07/07/2021 2328   2  How many drinks containing alcohol do you have on a typical day you are drinking? 0 Filed at: 07/07/2021 2328   3b  FEMALE Any Age, or MALE 65+: How often do you have 4 or more drinks on one occassion?   0 Filed at: 07/07/2021 2328 Audit-C Score  3 Filed at: 07/07/2021 232   JIE: How many times in the past year have you    Used an illegal drug or used a prescription medication for non-medical reasons? Never Filed at: 07/07/2021 2328                    [facility]  Number of Diagnoses or Management Options  Facial cellulitis: new and requires workup     Amount and/or Complexity of Data Reviewed  Clinical lab tests: ordered and reviewed  Tests in the radiology section of CPT®: reviewed and ordered  Tests in the medicine section of CPT®: ordered and reviewed  Review and summarize past medical records: yes  Discuss the patient with other providers: yes  Independent visualization of images, tracings, or specimens: yes    Risk of Complications, Morbidity, and/or Mortality  Presenting problems: moderate  Diagnostic procedures: low  Management options: low    Patient Progress  Patient progress: stable      Disposition  Final diagnoses:   Facial cellulitis     Time reflects when diagnosis was documented in both MDM as applicable and the Disposition within this note     Time User Action Codes Description Comment    7/8/2021  1:01 AM Haider Avery Add [D29 248] Facial cellulitis       ED Disposition     ED Disposition Condition Date/Time Comment    Admit Stable u Jul 8, 2021  1:01 AM Case was discussed with Ami Yates and the patient's admission status was agreed to be Admission Status: observation status to the service of Dr Ethan Bales   Follow-up Information    None         Patient's Medications    No medications on file     No discharge procedures on file      PDMP Review     None          ED Provider  Electronically Signed by           Elizabeth Artis MD  07/08/21 4439

## 2021-07-08 NOTE — CONSULTS
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) with airway compromise  - agree with Unasyn  - close monitoring for spreading  - needs ent or omfs evaluation today  - if worsens consider MRI for better evaluation for abscesses      History of Present Illness   Physician Requesting Consult: Rm Nolen MD  Reason for Consult / Principal Problem: facial cellulitis    HPI: Prasanna Zabala is a 38y o  year old female with H/O anxiety, asthma, salivary gland stones  Patient awoke yesterday with pain and swelling of her L cheek  She treated herself for a stone with water and sour candy  She did not improve and went to the urgent care, who prescribed her amoxicillin, she took one dose  But had spreading erythema from her cheek to her neck and came to ER last night  She had some trismus but that has improved  She had a CT of her face and neck that showed extensive right facial cellulitis overlying the mandible and extending into the submandibular space, involving the right masseter and platysma muscles  Swelling of the parotid and submandibular gland  No evidence of soft tissue abscess, as well as possible small periapical abscess R mandibular 2nd molar  She has been started on Unasyn and feels her swelling has gotten better though feels like it is extending across her R neck, her trismus is better  She has no difficulty swallowing or pharyngeal swelling  She has no headaches, chest pains, abd pains, n/v/d  She has had no tooth pain        Consults    ROS: 12 systems reviewed, remainder is neg     Historical Information   Past Medical History:   Diagnosis Date    Asthma      History reviewed  No pertinent surgical history  Social History   Social History     Substance and Sexual Activity   Alcohol Use Yes     Social History     Substance and Sexual Activity   Drug Use Not on file     Social History     Tobacco Use   Smoking Status Never Smoker   Smokeless Tobacco Never Used     History reviewed  No pertinent family history      Meds/Allergies   MEDS: reviewed      Current Facility-Administered Medications:     acetaminophen (TYLENOL) tablet 650 mg, 650 mg, Oral, Q6H PRN, Joselin Rivera PA-C    albuterol (PROVENTIL HFA,VENTOLIN HFA) inhaler 2 puff, 2 puff, Inhalation, Q6H PRN, Joselin Rivera PA-C    ampicillin-sulbactam (UNASYN) 3 g in sodium chloride 0 9% 100 mL IV syringe, 3 g, Intravenous, Q6H, Joselin Rivera PA-C, 3 g at 07/08/21 0950    calcium carbonate (TUMS) chewable tablet 1,000 mg, 1,000 mg, Oral, Daily PRN, Joselin Rivera PA-C    diphenhydrAMINE (BENADRYL) tablet 12 5 mg, 12 5 mg, Oral, HS PRN, Joselin Rivera PA-C, 12 5 mg at 07/08/21 5896    hydrOXYzine HCL (ATARAX) tablet 25 mg, 25 mg, Oral, Q6H PRN, Joselin Rivera PA-C    ketorolac (TORADOL) injection 15 mg, 15 mg, Intravenous, Q6H PRN, Earnest Junior MD    ondansetron (ZOFRAN) injection 4 mg, 4 mg, Intravenous, Q6H PRN, Joselin Rivera PA-C    senna (SENOKOT) tablet 8 6 mg, 1 tablet, Oral, HS PRN, Joselin Rivera PA-C    sodium chloride 0 9 % infusion, 75 mL/hr, Intravenous, Continuous, Earnest Junior MD    Current Outpatient Medications:     albuterol (PROVENTIL HFA,VENTOLIN HFA) 90 mcg/act inhaler, Inhale 2 puffs every 6 (six) hours as needed for wheezing, Disp: , Rfl:     cetirizine-pseudoephedrine (ZyrTEC-D) 5-120 MG per tablet, Take 1 tablet by mouth 2 (two) times a day, Disp: , Rfl:     fluticasone-salmeterol (Advair Diskus) 250-50 mcg/dose inhaler, Inhale 1 puff daily Rinse mouth after use , Disp: , Rfl:     norethindrone-ethinyl estradiol-ferrous fumarate (LOESTIN 24 FE) 1-20 MG-MCG(24) per tablet, Take 1 tablet by mouth daily, Disp: , Rfl:     hydrOXYzine pamoate (VISTARIL) 25 mg capsule, Take 25 mg by mouth 3 (three) times a day as needed for itching, Disp: , Rfl:     No Known Allergies      Intake/Output Summary (Last 24 hours) at 7/8/2021 1307  Last data filed at 7/8/2021 0141  Gross per 24 hour   Intake 1050 ml   Output --   Net 1050 ml       PE:  WD, WN, WF in NAD, non-toxic appearing  VSS, Tmax: 98 9  HEENT: EOMI, R parotid and mandibular swelling, erythema, unable to open mouth fully without pain   NECK: + erythema to midline,   CARDIAC: rrr s1s2, no murmurr  LUNGS: decreased  ABDOMEN: soft nt/nd + BS, no hsm  EXTREMITIES: no edema  SKIN: no rash  NEURO: grossly non-focal  PSYCH: nl affect  : no briones  JOINTS: full ROM without erythema or edema     Invasive Devices:   Peripheral IV 07/07/21 Right Antecubital (Active)   Site Assessment WDL; Clean;Dry; Intact 07/07/21 2315   Dressing Type Transparent 07/07/21 2315   Line Status Blood return noted; Flushed;Saline locked 07/07/21 2315   Dressing Status Clean;Dry; Intact 07/07/21 2315           Lab Results:   Admission on 07/07/2021   Component Date Value    EXT PREG TEST UR (Ref: N* 07/07/2021 Negative     Control 07/07/2021 Valid     WBC 07/07/2021 16 10*    RBC 07/07/2021 3 97     Hemoglobin 07/07/2021 12 8     Hematocrit 07/07/2021 37 9     MCV 07/07/2021 96     MCH 07/07/2021 32 2     MCHC 07/07/2021 33 8     RDW 07/07/2021 11 7     MPV 07/07/2021 9 1     Platelets 61/44/0626 253     Neutrophils Relative 07/07/2021 81*    Immat GRANS % 07/07/2021 0     Lymphocytes Relative 07/07/2021 9*    Monocytes Relative 07/07/2021 8     Eosinophils Relative 07/07/2021 2     Basophils Relative 07/07/2021 0     Neutrophils Absolute 07/07/2021 13 03*    Immature Grans Absolute 07/07/2021 0 03     Lymphocytes Absolute 07/07/2021 1 52     Monocytes Absolute 07/07/2021 1 24*    Eosinophils Absolute 07/07/2021 0 26     Basophils Absolute 07/07/2021 0 02     Sodium 07/07/2021 136     Potassium 07/07/2021 3 6     Chloride 07/07/2021 101     CO2 07/07/2021 24     ANION GAP 07/07/2021 11     BUN 07/07/2021 10     Creatinine 07/07/2021 0 81     Glucose 07/07/2021 113     Calcium 07/07/2021 8 1*    eGFR 07/07/2021 93     Sodium 07/08/2021 141     Potassium 07/08/2021 4 0     Chloride 07/08/2021 110*    CO2 07/08/2021 22     ANION GAP 07/08/2021 9     BUN 07/08/2021 5     Creatinine 07/08/2021 0 73     Glucose 07/08/2021 150*    Calcium 07/08/2021 8 0*    eGFR 07/08/2021 106     WBC 07/08/2021 13 64*    RBC 07/08/2021 4 03     Hemoglobin 07/08/2021 13 0     Hematocrit 07/08/2021 38 8     MCV 07/08/2021 96     MCH 07/08/2021 32 3     MCHC 07/08/2021 33 5     RDW 07/08/2021 11 8     Platelets 18/60/2087 250     MPV 07/08/2021 9 3      Imaging Studies: I have personally reviewed pertinent reports  EKG, Pathology, and Other Studies: I have personally reviewed pertinent reports        Culture  No results found for: BLOODCX  No results found for: WOUNDCULT  No results found for: URINECX  No results found for: SPUTUMCULTUR    Principal Problem:    Facial cellulitis  Active Problems:    Mild intermittent asthma without complication    Anxiety

## 2021-07-09 PROCEDURE — 99232 SBSQ HOSP IP/OBS MODERATE 35: CPT | Performed by: PHYSICIAN ASSISTANT

## 2021-07-09 RX ORDER — IBUPROFEN 600 MG/1
600 TABLET ORAL EVERY 6 HOURS PRN
Status: DISCONTINUED | OUTPATIENT
Start: 2021-07-09 | End: 2021-07-10 | Stop reason: HOSPADM

## 2021-07-09 RX ORDER — LORATADINE 10 MG/1
10 TABLET ORAL DAILY
Status: DISCONTINUED | OUTPATIENT
Start: 2021-07-09 | End: 2021-07-10 | Stop reason: HOSPADM

## 2021-07-09 RX ORDER — LORAZEPAM 0.5 MG/1
0.5 TABLET ORAL EVERY 8 HOURS PRN
Status: DISCONTINUED | OUTPATIENT
Start: 2021-07-09 | End: 2021-07-10 | Stop reason: HOSPADM

## 2021-07-09 RX ADMIN — VANCOMYCIN HYDROCHLORIDE 750 MG: 750 INJECTION, SOLUTION INTRAVENOUS at 16:13

## 2021-07-09 RX ADMIN — VANCOMYCIN HYDROCHLORIDE 750 MG: 750 INJECTION, SOLUTION INTRAVENOUS at 07:10

## 2021-07-09 RX ADMIN — AMPICILLIN SODIUM AND SULBACTAM SODIUM 3 G: 2; 1 INJECTION, POWDER, FOR SOLUTION INTRAMUSCULAR; INTRAVENOUS at 16:13

## 2021-07-09 RX ADMIN — DIPHENHYDRAMINE HYDROCHLORIDE 25 MG: 25 TABLET ORAL at 22:35

## 2021-07-09 RX ADMIN — IBUPROFEN 600 MG: 600 TABLET ORAL at 16:13

## 2021-07-09 RX ADMIN — ACETAMINOPHEN 650 MG: 325 TABLET, FILM COATED ORAL at 16:11

## 2021-07-09 RX ADMIN — LORAZEPAM 0.5 MG: 0.5 TABLET ORAL at 08:59

## 2021-07-09 RX ADMIN — ACETAMINOPHEN 650 MG: 325 TABLET, FILM COATED ORAL at 06:41

## 2021-07-09 RX ADMIN — FLUTICASONE FUROATE AND VILANTEROL TRIFENATATE 1 PUFF: 200; 25 POWDER RESPIRATORY (INHALATION) at 08:58

## 2021-07-09 RX ADMIN — SODIUM CHLORIDE 75 ML/HR: 0.9 INJECTION, SOLUTION INTRAVENOUS at 06:32

## 2021-07-09 RX ADMIN — KETOROLAC TROMETHAMINE 15 MG: 30 INJECTION, SOLUTION INTRAMUSCULAR; INTRAVENOUS at 09:58

## 2021-07-09 RX ADMIN — KETOROLAC TROMETHAMINE 15 MG: 30 INJECTION, SOLUTION INTRAMUSCULAR; INTRAVENOUS at 16:12

## 2021-07-09 RX ADMIN — LORAZEPAM 0.5 MG: 0.5 TABLET ORAL at 16:12

## 2021-07-09 RX ADMIN — AMPICILLIN SODIUM AND SULBACTAM SODIUM 3 G: 2; 1 INJECTION, POWDER, FOR SOLUTION INTRAMUSCULAR; INTRAVENOUS at 22:00

## 2021-07-09 RX ADMIN — KETOROLAC TROMETHAMINE 15 MG: 30 INJECTION, SOLUTION INTRAMUSCULAR; INTRAVENOUS at 02:52

## 2021-07-09 RX ADMIN — DIPHENHYDRAMINE HYDROCHLORIDE 25 MG: 25 TABLET ORAL at 16:12

## 2021-07-09 RX ADMIN — AMPICILLIN SODIUM AND SULBACTAM SODIUM 3 G: 2; 1 INJECTION, POWDER, FOR SOLUTION INTRAMUSCULAR; INTRAVENOUS at 03:38

## 2021-07-09 RX ADMIN — LORATADINE 10 MG: 10 TABLET ORAL at 10:07

## 2021-07-09 RX ADMIN — AMPICILLIN SODIUM AND SULBACTAM SODIUM 3 G: 2; 1 INJECTION, POWDER, FOR SOLUTION INTRAMUSCULAR; INTRAVENOUS at 09:28

## 2021-07-09 NOTE — CONSULTS
Consultation - ENT   Jessica Blount 38 y o  female MRN: [id number]  Unit/Bed#: -01 Encounter: [id number]      Assessment/Plan       History of Present Illness   Physician Requesting Consult: Ruiz Kumar MD  Reason for Consult / Principal Problem:  Facial swelling  HPI: Jessica Blount is a 38y o  year old female who reports a prior history of parotid sialolith several years ago  At that point she had swelling that resolved with the use of sialogogues/sour candy and massage with passing of the stone and resolution of the swelling/pain  On these new episodes she develops swelling, that progressed through the day with associated erythema of the skin  She was started on amoxicillin despite this the erythema extended to the upper right neck  No history of skin lesions, no history of dental pain  Denies any sore throat  Inpatient consult to ENT  Consult performed by: Sai Kirkpatrick MD  Consult ordered by: Ruiz Kumar MD          Review of Systems    Complete review done, only positive for the symptoms described in the H&P section above    Historical Information   Past Medical History:   Diagnosis Date    Asthma      History reviewed  No pertinent surgical history  Social History   Social History     Substance and Sexual Activity   Alcohol Use Yes     Social History     Substance and Sexual Activity   Drug Use Not on file     Social History     Tobacco Use   Smoking Status Never Smoker   Smokeless Tobacco Never Used     Family History: History reviewed  No pertinent family history      Meds/Allergies   Current Facility-Administered Medications   Medication Dose Route Frequency    acetaminophen (TYLENOL) tablet 650 mg  650 mg Oral Q6H PRN    albuterol (PROVENTIL HFA,VENTOLIN HFA) inhaler 2 puff  2 puff Inhalation Q6H PRN    ampicillin-sulbactam (UNASYN) 3 g in sodium chloride 0 9 % 100 mL IVPB  3 g Intravenous Q6H    calcium carbonate (TUMS) chewable tablet 1,000 mg  1,000 mg Oral Daily PRN    diphenhydrAMINE (BENADRYL) tablet 25 mg  25 mg Oral Q6H PRN    fluticasone-vilanterol (BREO ELLIPTA) 200-25 MCG/INH inhaler 1 puff  1 puff Inhalation Daily    hydrOXYzine HCL (ATARAX) tablet 25 mg  25 mg Oral Q6H PRN    ketorolac (TORADOL) injection 15 mg  15 mg Intravenous Q6H PRN    ondansetron (ZOFRAN) injection 4 mg  4 mg Intravenous Q6H PRN    senna (SENOKOT) tablet 8 6 mg  1 tablet Oral HS PRN    sodium chloride 0 9 % infusion  75 mL/hr Intravenous Continuous         No Known Allergies    Objective       Physical Exam   Blood pressure 110/56, pulse 85, temperature 98 1 °F (36 7 °C), temperature source Oral, resp  rate 18, height 5' 5" (1 651 m), weight 59 kg (130 lb), last menstrual period 06/23/2021, SpO2 99 %  Constitutional: Oriented to person, place, and time  Well-developed and well-nourished, no apparent distress, non-toxic appearance  Cooperative, able to hear and answer questions without difficulty  Voice: Normal voice quality  Head: Normocephalic, atraumatic  No scars, masses or lesions  Face:  Edema erythema right parotid area extending to the buccal, level 2 and 3 of the right neck as well  No sinus tenderness  Eyes: Vision grossly intact, extra-ocular movement intact  Right Ear: External ear normal   Auditory canal clear  Tympanic membrane well-appearing, without retraction or scarring  No fluid present  No post-auricular erythema or tenderness  Left Ear: External ear normal   Auditory canal clear  Tympanic membrane well-appearing, without retraction or scarring  No fluid present  No post-auricular erythema or tenderness  Nose: Septum midline, Mucosa moist, turbinates normal size, no edema  No polyps or masses, no discharge evident  Oral cavity:  Lips normal, no mucosal lesions  Moderate trismus, Dentition intact, in very good condition, no erythema of the gingiva, no evident dental tenderness, no obvious cavities  gingiva normal in appearance   Mucosa moist,  Tongue mobile, floor of mouth normal   Hard palate unremarkable  No masses or lesions  Oropharynx: Uvula is midline, soft palate normal   Unremarkable oropharyngeal inlet  Tonsils 1+ unremarkable  Posterior pharyngeal wall clear  No masses or lesions  Salivary glands:  Left Parotid and submandibular glands soft, no enlargement or tenderness  Right parotid gland firm, tender  No fluctuance  Compression of the gland reveals no saliva through the Stensen's duct, no pus either  Palpation of trajectory of the duct does not reveal any induration suggesting sialolith on the distal portion  Neck: Normal laryngeal elevation with swallow  Trachea midline  No masses or lesions  No palpable adenopathy  There is erythema/edema that extends to the levels 2 and 3 right neck  It has partially receded from the marking done on admission  Thyroid: normal in size, and consistency, unremarkable without tenderness or palpable nodules  Pulmonary/Chest: Normal effort and rate  No respiratory distress  Musculoskeletal: Normal range of motion  Neurological: Cranial nerves 2-12 intact  Skin: Skin is warm and dry  Psychiatric: Normal mood and affect  Lab Results: CBC:   Lab Results   Component Value Date    WBC 13 64 (H) 07/08/2021    HGB 13 0 07/08/2021    HCT 38 8 07/08/2021    MCV 96 07/08/2021     07/08/2021    MCH 32 3 07/08/2021    MCHC 33 5 07/08/2021    RDW 11 8 07/08/2021    MPV 9 3 07/08/2021   , CMP:   Lab Results   Component Value Date    K 4 0 07/08/2021     (H) 07/08/2021    CO2 22 07/08/2021    BUN 5 07/08/2021    CREATININE 0 73 07/08/2021    CALCIUM 8 0 (L) 07/08/2021    EGFR 106 07/08/2021     Imaging Studies: I have personally reviewed images on the PACS system and :  Patient does have significant enlargement and enhancement of the right parotid gland  This extends both on the superficial as well as the deep lobe of parotid  There is adjacent fat stranding  No evidence of radiopaque sialolith  No localization or formation of abscess  Incidentally, patient has a completely opacified right sphenoid sinus with heterogeneous content, there is a soft tissue density on the sphenoid ethmoid recess, possible polyp  Patient also has an air-fluid level on the left maxillary sinus that also is noticed to have diffuse mild mucosal thickening  There are some anterior ethmoid cells on the right side with mucosal thickening and or opacification  Frontal sinuses are clear  Assessment:  Right parotitis with associated cellulitis  No evidence clinically or on my review of the films of any dental abscess  Chronic sinusitis, patient denies any significant sinus pain or nasal congestion  She does complain of chronic allergies  Based on the physical exam of findings of the CT scan it appears that she does have more a chronic sinusitis than actual rhinitis  Plan:  Parotitis/cellulitis:  continue antibiotics IV, discussed with the patient the need for massage, warm compresses, also good hydration and sialagogues   Sinusitis:  Sinusitis may partially improved with the current antibiotics  Will need to be reassessed in the office, appears to requires surgical intervention of the sinuses  Code Status: Level 1 - Full Code  Advance Directive and Living Will:      Power of :    POLST:      Counseling/Coordination of Care: Total floor / unit time spent today 40 minutes  Greater than 50% of total time was spent with the patient and / or family counseling and / or coordination of care  A description of the counseling / coordination of care: Reviewed findings with the patient, also he showed the imaging to the patient for better explanation of the sinus findings

## 2021-07-09 NOTE — NURSING NOTE
Pt slept few hrly rounds overnight w q2hr assessments of erythema (area outlined yesterday-no increase)  States Toradol effective for discomfort and that she was happy to be able to rest comfortably on her left side in bed afterwards

## 2021-07-09 NOTE — PROGRESS NOTES
Vancomycin Assessment    Kallie Pinon is a 38 y o  female who is currently receiving vancomycin 750mg q 8 hours for MRSA suspected, skin-soft tissue infection     Relevant clinical data and objective history reviewed:  Creatinine   Date Value Ref Range Status   07/08/2021 0 73 0 60 - 1 30 mg/dL Final     Comment:     Standardized to IDMS reference method   07/07/2021 0 81 0 60 - 1 30 mg/dL Final     Comment:     Standardized to IDMS reference method     /62 (BP Location: Left arm)   Pulse (!) 109   Temp 98 6 °F (37 °C) (Oral)   Resp 18   Ht 5' 5" (1 651 m)   Wt 59 kg (130 lb)   LMP 06/23/2021   SpO2 99%   BMI 21 63 kg/m²   I/O last 3 completed shifts: In: 1050 [IV Piggyback:1050]  Out: -   Lab Results   Component Value Date/Time    BUN 5 07/08/2021 04:46 AM    WBC 13 64 (H) 07/08/2021 04:46 AM    HGB 13 0 07/08/2021 04:46 AM    HCT 38 8 07/08/2021 04:46 AM    MCV 96 07/08/2021 04:46 AM     07/08/2021 04:46 AM     Temp Readings from Last 3 Encounters:   07/08/21 98 6 °F (37 °C) (Oral)     Vancomycin Days of Therapy: 1    Assessment/Plan  The patient is currently on vancomycin utilizing scheduled dosing  Baseline risks associated with therapy include: N/A  The patient is receiving 750mg q 8 hours  Pharmacy will continue to follow closely for s/sx of nephrotoxicity, infusion reactions, and appropriateness of therapy  BMP and CBC will be ordered per protocol  Plan for trough as patient approaches steady state, prior to the 4th  dose at approximately 07/09/2021 @ 2230  Pharmacy will continue to follow the patients culture results and clinical progress daily      Jose Luis Foley, Pharmacist PharmD, BCPS

## 2021-07-09 NOTE — QUICK NOTE
Notified by RN that patient was complaining of increased tightness in her face  Patient seen and evaluated at bedside  Erythema noted to be spread beyond skin marking that was placed at 2:00 a m  this morning on admission  I spoke with ENT on-call, Dr Hawk Lay, as they were consulted on the patient  He stated that at 5:00 p m  on his evaluation the erythema had been receding from the line, however as erythema seems to be spreading now he recommended adding Staph aureus coverage  Will add vancomycin to cover for possible MRSA as Unasyn is already covering for MSSA  Pharmacy consulted for vancomycin management  New line drawn at erythema boundary  Will re-evaluate in 2 hours (0030)  Discussed with Stacie Hendrickson, [facility], who agreed that if erythema spreads beyond marking at 2 hour araceli pt should be upgraded to [address] for close airway monitoring  Pt placed on q2h evaluations

## 2021-07-09 NOTE — PROGRESS NOTES
[place]  Progress Note - Jessica Fonsecaudder 1985, 43 y o  female MRN: [id number]  Unit/Bed#: -01 Encounter: [id number]  Primary Care Provider: No primary care provider on file  Date and time admitted to hospital: 7/7/2021 10:37 PM    * Facial cellulitis  Assessment & Plan  · Woke up at 0600 on 07/07 with pain and swelling of her right cheek that progressively worsened with developing erythema  · seen at urgent care and started on amoxicillin, however she developed a low-grade temperature so she presented to the ED - 1 dose of amoxicillin taken  · CT facial bones: "Extensive right facial cellulitis overlying the mandible and extending into the submandibular space, involving the right masseter and platysma muscles  No evidence of soft tissue abscess  Inflammation of the right parotid gland and to a lesser extent the right submandibular gland, likely secondary to surrounding cellulitis rather than representing primary sialoadenitis  Possible small periapical abscess in the right mandibular 2nd molar suggestive of a possible odontogenic source of infection  No evidence of subperiosteal abscess  Paranasal sinus disease "  · Leukocytosis 16 10 K on admission  · Received Ancef in the ED,   Transitioned to Unasyn due to concern for possible odontogenic etiology  · Continue Unasyn  ·  Vancomycin added the evening of 7/8 per ENT request, by SLIM  · Patient without any dental pain, tenderness, or dental pain prior to symptom onset  · Erythematous boundary marked on admission  · ID consulted, appreciate ongoing recommendations    Anxiety  Assessment & Plan  · Patient reports she is described Vistaril 25 mg p r n , however she has not used it  · Will prescribe Vistaril here    Mild intermittent asthma without complication  Assessment & Plan  · Home regimen: Ventolin p r n    · Will continue home regimen   · No signs of acute exacerbation on admission      VTE Pharmacologic Prophylaxis: VTE Score: 1 Low Risk (Score 0-2) - Encourage Ambulation  Patient Centered Rounds: I performed bedside rounds with nursing staff today  Discussions with Specialists or Other Care Team Provider:  Discussed with infectious disease, patient requesting discharge as soon as possible given that it is her birthday and she has a celebration planned for tomorrow  Discussed the Infectious Disease will need to see her before she can transition to oral antibiotics, she may require additional days of IV antibiotics  She is reluctantly agreeable  Discussed with attending, possible dual treatment with Keflex and Bactrim at time of discharge but await ID recommendations  Education and Discussions with Family / Patient: Updated  (significant other) at bedside  Time Spent for Care: 30 minutes  More than 50% of total time spent on counseling and coordination of care as described above  Current Length of Stay: 1 day(s)  Current Patient Status: Inpatient   Certification Statement: The patient will continue to require additional inpatient hospital stay due to IV antibiotics  Discharge Plan: Anticipate discharge in 24-48 hrs to home  Code Status: Level 1 - Full Code    Subjective:   Patient eager for discharge, it is her birthday today and she has a celebration plan for tomorrow  She wants to transition to oral antibiotics as soon as possible  Discussed that Infectious Disease will need to make the determination  She is pleased with how much the redness and swelling has gone down so far  Denies any shortness of breath, chest pain, nausea, vomiting, difficulty breathing  Objective:     Vitals:   Temp (24hrs), Av 3 °F (36 8 °C), Min:97 8 °F (36 6 °C), Max:98 8 °F (37 1 °C)    Temp:  [97 8 °F (36 6 °C)-98 8 °F (37 1 °C)] 97 8 °F (36 6 °C)  HR:  [] 80  Resp:  [16-18] 16  BP: (110-129)/(56-62) 125/61  SpO2:  [97 %-99 %] 98 %  Body mass index is 21 63 kg/m²       Input and Output Summary (last 24 hours): Intake/Output Summary (Last 24 hours) at 7/9/2021 1431  Last data filed at 7/9/2021 0551  Gross per 24 hour   Intake 670 ml   Output --   Net 670 ml       Physical Exam:   Physical Exam  Vitals and nursing note reviewed  Constitutional:       General: She is not in acute distress  Appearance: Normal appearance  She is well-developed  HENT:      Head: Normocephalic and atraumatic  Eyes:      General: No scleral icterus  Conjunctiva/sclera: Conjunctivae normal    Cardiovascular:      Rate and Rhythm: Normal rate and regular rhythm  Heart sounds: No murmur heard  Pulmonary:      Effort: Pulmonary effort is normal       Breath sounds: No wheezing, rhonchi or rales  Abdominal:      General: There is no distension  Palpations: Abdomen is soft  Skin:     General: Skin is warm and dry  Neurological:      General: No focal deficit present  Mental Status: She is alert     Psychiatric:         Mood and Affect: Mood normal        Additional Data:     Labs:  Results from last 7 days   Lab Units 07/08/21  0446 07/07/21  2315   WBC Thousand/uL 13 64* 16 10*   HEMOGLOBIN g/dL 13 0 12 8   HEMATOCRIT % 38 8 37 9   PLATELETS Thousands/uL 250 253   NEUTROS PCT %  --  81*   LYMPHS PCT %  --  9*   MONOS PCT %  --  8   EOS PCT %  --  2     Results from last 7 days   Lab Units 07/08/21  0446   SODIUM mmol/L 141   POTASSIUM mmol/L 4 0   CHLORIDE mmol/L 110*   CO2 mmol/L 22   BUN mg/dL 5   CREATININE mg/dL 0 73   ANION GAP mmol/L 9   CALCIUM mg/dL 8 0*   GLUCOSE RANDOM mg/dL 150*                       Lines/Drains:  Invasive Devices     Peripheral Intravenous Line            Peripheral IV 07/07/21 Right Antecubital 1 day                      Imaging: Reviewed radiology reports from this admission including: CT facial bones    Recent Cultures (last 7 days):         Last 24 Hours Medication List:   Current Facility-Administered Medications   Medication Dose Route Frequency Provider Last Rate  acetaminophen  650 mg Oral Q6H PRN Clide Lemming, PA-C      albuterol  2 puff Inhalation Q6H PRN Clide Lemming, PA-C      ampicillin-sulbactam  3 g Intravenous Q6H Clide Lemming, PA-C 3 g (07/09/21 9116)    calcium carbonate  1,000 mg Oral Daily PRN Clide Lemming, PA-C      diphenhydrAMINE  25 mg Oral Q6H PRN Selena Dee MD      fluticasone-vilanterol  1 puff Inhalation Daily Selena Dee MD      ketorolac  15 mg Intravenous Q6H PRN Selena Dee MD      loratadine  10 mg Oral Daily [address] [place] [address], PA-LATONIA      LORazepam  0 5 mg Oral Q8H PRN Clide Lemming, PA-LATONIA      ondansetron  4 mg Intravenous Q6H PRN Clide Lemming, PA-LATONIA      senna  1 tablet Oral HS PRN Clide Lemming, PA-C      sodium chloride  75 mL/hr Intravenous Continuous Selena Dee MD 75 mL/hr (07/09/21 9883)    vancomycin  750 mg Intravenous Q8H Clide Lemming, PA-C 750 mg (07/09/21 0710)        Today, Patient Was Seen By: Eliu Proctor PA-C    **Please Note: This note may have been constructed using a voice recognition system  **

## 2021-07-09 NOTE — CASE MANAGEMENT
CM made aware during care team rounds patient is a tentative DC for today pending ID and further medical stability  Patient is from home with spouse, and independent with no anticipated CM needs  Patient per nursing is ambulatory around room  CM will continue to be available for potential care coordination needs

## 2021-07-09 NOTE — ASSESSMENT & PLAN NOTE
· Woke up at 0600 on 07/07 with pain and swelling of her right cheek that progressively worsened with developing erythema  · seen at urgent care and started on amoxicillin, however she developed a low-grade temperature so she presented to the ED - 1 dose of amoxicillin taken  · CT facial bones: "Extensive right facial cellulitis overlying the mandible and extending into the submandibular space, involving the right masseter and platysma muscles  No evidence of soft tissue abscess  Inflammation of the right parotid gland and to a lesser extent the right submandibular gland, likely secondary to surrounding cellulitis rather than representing primary sialoadenitis  Possible small periapical abscess in the right mandibular 2nd molar suggestive of a possible odontogenic source of infection  No evidence of subperiosteal abscess   Paranasal sinus disease "  · Leukocytosis 16 10 K on admission  · Received Ancef in the ED,   Transitioned to Unasyn due to concern for possible odontogenic etiology  · Continue Unasyn  ·  Vancomycin added the evening of 7/8 per ENT request, by SLIM  · Patient without any dental pain, tenderness, or dental pain prior to symptom onset  · Erythematous boundary marked on admission  · ID consulted, appreciate ongoing recommendations

## 2021-07-09 NOTE — PROGRESS NOTES
Vancomycin IV Pharmacy-to-Dose Consultation    Martita Resendiz is a 43 y o  female who is currently receiving Vancomycin IV with management by the Pharmacy Consult service  Assessment/Plan:  The patient was reviewed  Renal function is stable and no signs or symptoms of nephrotoxicity and/or infusion reactions were documented in the chart  Based on todays assessment, continue current vancomycin (day # 2) dosing of 750mg q8h, with a plan for trough to be drawn at 0630 on 7/10  We will continue to follow the patients culture results and clinical progress daily      Dong eKy, Pharmacist

## 2021-07-10 VITALS
BODY MASS INDEX: 21.66 KG/M2 | HEART RATE: 80 BPM | HEIGHT: 65 IN | SYSTOLIC BLOOD PRESSURE: 123 MMHG | RESPIRATION RATE: 18 BRPM | OXYGEN SATURATION: 98 % | DIASTOLIC BLOOD PRESSURE: 83 MMHG | TEMPERATURE: 97.9 F | WEIGHT: 130 LBS

## 2021-07-10 LAB
ANION GAP SERPL CALCULATED.3IONS-SCNC: 10 MMOL/L (ref 4–13)
BASOPHILS # BLD AUTO: 0.02 THOUSANDS/ΜL (ref 0–0.1)
BASOPHILS NFR BLD AUTO: 0 % (ref 0–1)
BUN SERPL-MCNC: 3 MG/DL (ref 5–25)
CALCIUM SERPL-MCNC: 8.4 MG/DL (ref 8.3–10.1)
CHLORIDE SERPL-SCNC: 109 MMOL/L (ref 100–108)
CO2 SERPL-SCNC: 24 MMOL/L (ref 21–32)
CREAT SERPL-MCNC: 0.65 MG/DL (ref 0.6–1.3)
EOSINOPHIL # BLD AUTO: 0.37 THOUSAND/ΜL (ref 0–0.61)
EOSINOPHIL NFR BLD AUTO: 6 % (ref 0–6)
ERYTHROCYTE [DISTWIDTH] IN BLOOD BY AUTOMATED COUNT: 11.8 % (ref 11.6–15.1)
GFR SERPL CREATININE-BSD FRML MDRD: 113 ML/MIN/1.73SQ M
GLUCOSE SERPL-MCNC: 99 MG/DL (ref 65–140)
HCT VFR BLD AUTO: 33.1 % (ref 34.8–46.1)
HGB BLD-MCNC: 10.8 G/DL (ref 11.5–15.4)
IMM GRANULOCYTES # BLD AUTO: 0.02 THOUSAND/UL (ref 0–0.2)
IMM GRANULOCYTES NFR BLD AUTO: 0 % (ref 0–2)
LYMPHOCYTES # BLD AUTO: 1.69 THOUSANDS/ΜL (ref 0.6–4.47)
LYMPHOCYTES NFR BLD AUTO: 25 % (ref 14–44)
MCH RBC QN AUTO: 32 PG (ref 26.8–34.3)
MCHC RBC AUTO-ENTMCNC: 32.6 G/DL (ref 31.4–37.4)
MCV RBC AUTO: 98 FL (ref 82–98)
MONOCYTES # BLD AUTO: 0.46 THOUSAND/ΜL (ref 0.17–1.22)
MONOCYTES NFR BLD AUTO: 7 % (ref 4–12)
NEUTROPHILS # BLD AUTO: 4.13 THOUSANDS/ΜL (ref 1.85–7.62)
NEUTS SEG NFR BLD AUTO: 62 % (ref 43–75)
NRBC BLD AUTO-RTO: 0 /100 WBCS
PLATELET # BLD AUTO: 237 THOUSANDS/UL (ref 149–390)
PMV BLD AUTO: 9.5 FL (ref 8.9–12.7)
POTASSIUM SERPL-SCNC: 4.2 MMOL/L (ref 3.5–5.3)
RBC # BLD AUTO: 3.37 MILLION/UL (ref 3.81–5.12)
SODIUM SERPL-SCNC: 143 MMOL/L (ref 136–145)
VANCOMYCIN TROUGH SERPL-MCNC: 12.5 UG/ML (ref 10–20)
WBC # BLD AUTO: 6.69 THOUSAND/UL (ref 4.31–10.16)

## 2021-07-10 PROCEDURE — 85025 COMPLETE CBC W/AUTO DIFF WBC: CPT | Performed by: INTERNAL MEDICINE

## 2021-07-10 PROCEDURE — 80048 BASIC METABOLIC PNL TOTAL CA: CPT | Performed by: INTERNAL MEDICINE

## 2021-07-10 PROCEDURE — 99239 HOSP IP/OBS DSCHRG MGMT >30: CPT | Performed by: PHYSICIAN ASSISTANT

## 2021-07-10 PROCEDURE — 80202 ASSAY OF VANCOMYCIN: CPT | Performed by: PHYSICIAN ASSISTANT

## 2021-07-10 RX ORDER — SULFAMETHOXAZOLE AND TRIMETHOPRIM 800; 160 MG/1; MG/1
1 TABLET ORAL EVERY 12 HOURS SCHEDULED
Qty: 16 TABLET | Refills: 0 | Status: SHIPPED | OUTPATIENT
Start: 2021-07-10 | End: 2021-07-18

## 2021-07-10 RX ORDER — AMOXICILLIN AND CLAVULANATE POTASSIUM 875; 125 MG/1; MG/1
1 TABLET, FILM COATED ORAL EVERY 12 HOURS SCHEDULED
Qty: 16 TABLET | Refills: 0 | Status: SHIPPED | OUTPATIENT
Start: 2021-07-10 | End: 2021-07-18

## 2021-07-10 RX ADMIN — VANCOMYCIN HYDROCHLORIDE 750 MG: 750 INJECTION, SOLUTION INTRAVENOUS at 08:01

## 2021-07-10 RX ADMIN — LORATADINE 10 MG: 10 TABLET ORAL at 08:02

## 2021-07-10 RX ADMIN — VANCOMYCIN HYDROCHLORIDE 750 MG: 750 INJECTION, SOLUTION INTRAVENOUS at 00:04

## 2021-07-10 RX ADMIN — AMPICILLIN SODIUM AND SULBACTAM SODIUM 3 G: 2; 1 INJECTION, POWDER, FOR SOLUTION INTRAMUSCULAR; INTRAVENOUS at 04:15

## 2021-07-10 RX ADMIN — SODIUM CHLORIDE 75 ML/HR: 0.9 INJECTION, SOLUTION INTRAVENOUS at 06:38

## 2021-07-10 RX ADMIN — FLUTICASONE FUROATE AND VILANTEROL TRIFENATATE 1 PUFF: 200; 25 POWDER RESPIRATORY (INHALATION) at 08:02

## 2021-07-10 RX ADMIN — AMPICILLIN SODIUM AND SULBACTAM SODIUM 3 G: 2; 1 INJECTION, POWDER, FOR SOLUTION INTRAMUSCULAR; INTRAVENOUS at 09:27

## 2021-07-10 NOTE — DISCHARGE INSTR - AVS FIRST PAGE
Dear Tim Bello,     It was our pleasure to care for you here at [address]  It is our hope that we were always able to exceed the expected standards for your care during your stay  You were hospitalized due to facial cellulitis  You were cared for on the 3rd floor by Mendoza Waite PA-C under the service of Barbra Benítez MD with the [facility] who covers for your primary care physician (PCP), No primary care provider on file  , while you were hospitalized  If you have any questions or concerns related to this hospitalization, you may contact us at [phone]  For follow up as well as any medication refills, we recommend that you follow up with your primary care physician  A registered nurse will reach out to you by phone within a few days after your discharge to answer any additional questions that you may have after going home  However, at this time we provide for you here, the most important instructions / recommendations at discharge:     · Notable Medication Adjustments -   · Complete course of antibiotics with Augmentin and Bactrim twice daily through 7/18  · Testing Required after Discharge -   · None  · Important follow up information -   · Follow-up with her primary care provider within 1 week  · Other Instructions -   · Return to hospital if redness worsens or you experience any difficulty breathing  · Enjoy your Birthday Party! · Please review this entire after visit summary as additional general instructions including medication list, appointments, activity, diet, any pertinent wound care, and other additional recommendations from your care team that may be provided for you        Sincerely,     Mendoza Waite PA-C

## 2021-07-10 NOTE — DISCHARGE SUMMARY
[place]  Discharge- [facility] 1985, 43 y o  female MRN: [id number]  Unit/Bed#: -01 Encounter: [id number]  Primary Care Provider: No primary care provider on file  Date and time admitted to hospital: 7/7/2021 10:37 PM    * Facial cellulitis  Assessment & Plan  · Woke up at 0600 on 07/07 with pain and swelling of her right cheek that progressively worsened with developing erythema  · seen at urgent care and started on amoxicillin, however she developed a low-grade temperature so she presented to the ED - 1 dose of amoxicillin taken  · CT facial bones: "Extensive right facial cellulitis overlying the mandible and extending into the submandibular space, involving the right masseter and platysma muscles  No evidence of soft tissue abscess  Inflammation of the right parotid gland and to a lesser extent the right submandibular gland, likely secondary to surrounding cellulitis rather than representing primary sialoadenitis  Possible small periapical abscess in the right mandibular 2nd molar suggestive of a possible odontogenic source of infection  No evidence of subperiosteal abscess  Paranasal sinus disease "  · Leukocytosis 16 10 K on admission  · Received Ancef in the ED,   Transitioned to Unasyn due to concern for possible odontogenic etiology  · Continue Unasyn  ·  Vancomycin added the evening of 7/8 per ENT request, by [facility]  · Patient without any dental pain, tenderness, or dental pain prior to symptom onset  · Erythematous boundary marked on admission  · ID consulted, appreciate ongoing recommendations  · Discharge on Augmentin and Bactrim b i d   Through 7/18    Anxiety  Assessment & Plan  · Patient reports she is described Vistaril 25 mg p r n , however she has not used it  · Will prescribe Vistaril here    Mild intermittent asthma without complication  Assessment & Plan  · Home regimen: Ventolin p r n  and Advair, substituted for Breo while admitted  · Will continue home regimen   · No signs of acute exacerbation on admission    Medical Problems     Resolved Problems  Date Reviewed: 7/10/2021    None              Discharging Physician / Practitioner: Kris Naranjo PA-C  PCP: No primary care provider on file  Admission Date:   Admission Orders (From admission, onward)     Ordered        07/08/21 0102  INPATIENT ADMISSION  Once                   Discharge Date: 07/10/21    Consultations During Hospital Stay:  · Infectious Disease  · ENT    Procedures Performed:   · None    Significant Findings / Test Results:   · CT facial bone 7/7:  · Extensive right facial cellulitis overlying the mandible and extending into the submandibular space, involving the right masseter and platysma muscles  No evidence of soft tissue abscess  · Inflammation of the right parotid gland and to a lesser extent the right submandibular gland, likely secondary to surrounding cellulitis rather than representing primary sialoadenitis  · Possible small periapical abscess in the right mandibular 2nd molar suggestive of a possible odontogenic source of infection  No evidence of subperiosteal abscess  · Paranasal sinus disease  Incidental Findings:   · See above     Test Results Pending at Discharge (will require follow up): · None     Outpatient Tests Requested:  · None    Complications:  None    Reason for Admission: Facial cellulitis    Hospital Course:   Ольга Swan is a 43 y o  female patient past medical history of sialolithiasis, anxiety, asthma who originally presented to the hospital on 7/7/2021 due to swelling over right cheek noted morning of admission  Pain and swelling worsened throughout the day and subsequently developed erythema  Urgent care center started patient on amoxicillin, erythema continued to spread and patient developed a low-grade fevers so she came to the emergency department  CT facial bone results as above, leukocytosis noted on admission with WBC of 16 1  She received Ancef in emergency department was transitioned to Unasyn  ENT was consulted and after evaluation was recommending the addition of vancomycin  Infectious Disease also consulted  Patient was continued on IV antibiotics  Significant improvement in erythema, swelling, tenderness  WBC trended towards normal   Infectious Disease cleared patient for transition to oral antibiotics of Augmentin and Bactrim b i d  Through 7/18  Patient hemodynamically stable at time of discharge and appropriate for outpatient follow-up  Please see above list of diagnoses and related plan for additional information  Condition at Discharge: stable    Discharge Day Visit / Exam:   Subjective:  Patient feels much better today, she is able to open and close her jaw without pain  She there is still mild swelling however she feels dramatically better and is eager for discharge today so she may celebrate her birthday  Vitals: Blood Pressure: 123/83 (07/10/21 1020)  Pulse: 80 (07/10/21 1020)  Temperature: 97 9 °F (36 6 °C) (07/10/21 1020)  Temp Source: Oral (07/09/21 1634)  Respirations: 18 (07/09/21 2005)  Height: 5' 5" (165 1 cm) (07/07/21 2238)  Weight - Scale: 59 kg (130 lb) (07/07/21 2238)  SpO2: 98 % (07/10/21 1020)  Exam:   Physical Exam  Vitals and nursing note reviewed  Constitutional:       General: She is not in acute distress  Appearance: Normal appearance  She is well-developed  HENT:      Head: Normocephalic and atraumatic  Eyes:      General: No scleral icterus  Conjunctiva/sclera: Conjunctivae normal    Cardiovascular:      Rate and Rhythm: Normal rate and regular rhythm  Heart sounds: No murmur heard  Pulmonary:      Effort: Pulmonary effort is normal       Breath sounds: No wheezing, rhonchi or rales  Abdominal:      General: There is no distension  Palpations: Abdomen is soft  Skin:     General: Skin is warm and dry        Comments: Trace swelling and residual erythema noted over right side of face, otherwise dramatically improved  Neurological:      General: No focal deficit present  Mental Status: She is alert  Psychiatric:         Mood and Affect: Mood normal         Discussion with Family: Patient declined call to   Discharge instructions/Information to patient and family:   See after visit summary for information provided to patient and family  Provisions for Follow-Up Care:  See after visit summary for information related to follow-up care and any pertinent home health orders  Disposition:   Home    Planned Readmission: None     Discharge Statement:  I spent 65 minutes discharging the patient  This time was spent on the day of discharge  I had direct contact with the patient on the day of discharge  Greater than 50% of the total time was spent examining patient, answering all patient questions, arranging and discussing plan of care with patient as well as directly providing post-discharge instructions  Additional time then spent on discharge activities  Discharge Medications:  See after visit summary for reconciled discharge medications provided to patient and/or family        **Please Note: This note may have been constructed using a voice recognition system**

## 2021-07-10 NOTE — PROGRESS NOTES
Vancomycin Assessment    Silverio Sarmiento is a 43 y o  female who is currently receiving vancomycin 750mg q8h for MRSA suspected, skin-soft tissue infection     Relevant clinical data and objective history reviewed:  Creatinine   Date Value Ref Range Status   07/10/2021 0 65 0 60 - 1 30 mg/dL Final     Comment:     Standardized to IDMS reference method   07/08/2021 0 73 0 60 - 1 30 mg/dL Final     Comment:     Standardized to IDMS reference method   07/07/2021 0 81 0 60 - 1 30 mg/dL Final     Comment:     Standardized to IDMS reference method     BP 90/63   Pulse 83   Temp 98 2 °F (36 8 °C)   Resp 18   Ht 5' 5" (1 651 m)   Wt 59 kg (130 lb)   LMP 06/23/2021   SpO2 98%   BMI 21 63 kg/m²   I/O last 3 completed shifts: In: 0978 [P O :520; I V :450; IV Piggyback:100]  Out: -   Lab Results   Component Value Date/Time    BUN 3 (L) 07/10/2021 06:35 AM    WBC 6 69 07/10/2021 06:35 AM    HGB 10 8 (L) 07/10/2021 06:35 AM    HCT 33 1 (L) 07/10/2021 06:35 AM    MCV 98 07/10/2021 06:35 AM     07/10/2021 06:35 AM     Temp Readings from Last 3 Encounters:   07/09/21 98 2 °F (36 8 °C)     Vancomycin Days of Therapy: 3    Assessment/Plan  The patient is currently on vancomycin utilizing scheduled dosing  The patient is receiving 750mg q8h with the most recent vancomycin level (12 5 mcg/mL) being not at steady-state and sub-therapeutic based on a goal of 15-20 (appropriate for most indications) ; therefore, after clinical evaluation will be changed to 1250 mg every 8 hours  Pharmacy will continue to follow closely for s/sx of nephrotoxicity, infusion reactions, and appropriateness of therapy  BMP and CBC will be ordered per protocol  Plan for trough as patient approaches steady state, prior to the 4th  dose at approximately 07/11/21 at 1230  Pharmacy will continue to follow the patients culture results and clinical progress daily      Davin Pollock, Pharmacist

## 2021-07-10 NOTE — PLAN OF CARE
Problem: Potential for Falls  Goal: Patient will remain free of falls  Description: INTERVENTIONS:  - Educate patient/family on patient safety including physical limitations  - Instruct patient to call for assistance with activity   - Consult OT/PT to assist with strengthening/mobility   - Keep Call bell within reach  - Keep bed low and locked with side rails adjusted as appropriate  - Keep care items and personal belongings within reach  - Initiate and maintain comfort rounds  - Make Fall Risk Sign visible to staff  - Offer Toileting every  Hours, in advance of need  - Initiate/Maintain alarm  - Obtain necessary fall risk management equipment  - Apply yellow socks and bracelet for high fall risk patients  - Consider moving patient to room near nurses station  Outcome: Progressing     Problem: PAIN - ADULT  Goal: Verbalizes/displays adequate comfort level or baseline comfort level  Description: Interventions:  - Encourage patient to monitor pain and request assistance  - Assess pain using appropriate pain scale  - Administer analgesics based on type and severity of pain and evaluate response  - Implement non-pharmacological measures as appropriate and evaluate response  - Consider cultural and social influences on pain and pain management  - Notify physician/advanced practitioner if interventions unsuccessful or patient reports new pain  Outcome: Progressing     Problem: INFECTION - ADULT  Goal: Absence or prevention of progression during hospitalization  Description: INTERVENTIONS:  - Assess and monitor for signs and symptoms of infection  - Monitor lab/diagnostic results  - Monitor all insertion sites, i e  indwelling lines, tubes, and drains  - Monitor endotracheal if appropriate and nasal secretions for changes in amount and color  - [place] appropriate cooling/warming therapies per order  - Administer medications as ordered  - Instruct and encourage patient and family to use good hand hygiene technique  - Identify and instruct in appropriate isolation precautions for identified infection/condition  Outcome: Progressing  Goal: Absence of fever/infection during neutropenic period  Description: INTERVENTIONS:  - Monitor WBC    Outcome: Progressing     Problem: SAFETY ADULT  Goal: Patient will remain free of falls  Description: INTERVENTIONS:  - Educate patient/family on patient safety including physical limitations  - Instruct patient to call for assistance with activity   - Consult OT/PT to assist with strengthening/mobility   - Keep Call bell within reach  - Keep bed low and locked with side rails adjusted as appropriate  - Keep care items and personal belongings within reach  - Initiate and maintain comfort rounds  - Make Fall Risk Sign visible to staff  - Offer Toileting every 2 Hours, in advance of need  - Initiate/Maintain alarm  - Obtain necessary fall risk management equipment  - Apply yellow socks and bracelet for high fall risk patients  - Consider moving patient to room near nurses station  Outcome: Progressing  Goal: Maintain or return to baseline ADL function  Description: INTERVENTIONS:  -  Assess patient's ability to carry out ADLs; assess patient's baseline for ADL function and identify physical deficits which impact ability to perform ADLs (bathing, care of mouth/teeth, toileting, grooming, dressing, etc )  - Assess/evaluate cause of self-care deficits   - Assess range of motion  - Assess patient's mobility; develop plan if impaired  - Assess patient's need for assistive devices and provide as appropriate  - Encourage maximum independence but intervene and supervise when necessary  - Involve family in performance of ADLs  - Assess for home care needs following discharge   - Consider OT consult to assist with ADL evaluation and planning for discharge  - Provide patient education as appropriate  Outcome: Progressing  Goal: Maintains/Returns to pre admission functional level  Description: INTERVENTIONS:  - Perform BMAT or MOVE assessment daily    - Set and communicate daily mobility goal to care team and patient/family/caregiver  - Collaborate with rehabilitation services on mobility goals if consulted  - Perform Range of Motion 4 times a day  - Reposition patient every 2 hours  - Dangle patient 4 times a day  - Stand patient 4 times a day  - Ambulate patient 4 times a day  - Out of bed to chair 4 times a day   - Out of bed for meals 3 times a day  - Out of bed for toileting  - Record patient progress and toleration of activity level   Outcome: Progressing     Problem: DISCHARGE PLANNING  Goal: Discharge to home or other facility with appropriate resources  Description: INTERVENTIONS:  - Identify barriers to discharge w/patient and caregiver  - Arrange for needed discharge resources and transportation as appropriate  - Identify discharge learning needs (meds, wound care, etc )  - Arrange for interpretive services to assist at discharge as needed  - Refer to Case Management Department for coordinating discharge planning if the patient needs post-hospital services based on physician/advanced practitioner order or complex needs related to functional status, cognitive ability, or social support system  Outcome: Progressing     Problem: Knowledge Deficit  Goal: Patient/family/caregiver demonstrates understanding of disease process, treatment plan, medications, and discharge instructions  Description: Complete learning assessment and assess knowledge base    Interventions:  - Provide teaching at level of understanding  - Provide teaching via preferred learning methods  Outcome: Progressing     Problem: Prexisting or High Potential for Compromised Skin Integrity  Goal: Skin integrity is maintained or improved  Description: INTERVENTIONS:  - Identify patients at risk for skin breakdown  - Assess and monitor skin integrity  - Assess and monitor nutrition and hydration status  - Monitor labs   - Assess for incontinence   - Turn and reposition patient  - Assist with mobility/ambulation  - Relieve pressure over bony prominences  - Avoid friction and shearing  - Provide appropriate hygiene as needed including keeping skin clean and dry  - Evaluate need for skin moisturizer/barrier cream  - Collaborate with interdisciplinary team   - Patient/family teaching  - Consider wound care consult   Outcome: Progressing

## 2021-07-10 NOTE — DISCHARGE INSTRUCTIONS
Cellulitis   WHAT YOU NEED TO KNOW:   Cellulitis is a skin infection caused by bacteria  Cellulitis may go away on its own or you may need treatment  Your healthcare provider may draw a [place] around the outside edges of your cellulitis  If your cellulitis spreads, your healthcare provider will see it outside of the [place]  DISCHARGE INSTRUCTIONS:   Call 911 if:   · You have sudden trouble breathing or chest pain  Seek care immediately if:   · Your wound gets larger and more painful  · You feel a crackling under your skin when you touch it  · You have purple dots or bumps on your skin, or you see bleeding under your skin  · You have new swelling and pain in your legs  · The red, warm, swollen area gets larger  · You see red streaks coming from the infected area  Contact your healthcare provider if:   · You have a fever  · Your fever or pain does not go away or gets worse  · The area does not get smaller after 2 days of antibiotics  · Your skin is flaking or peeling off  · You have questions or concerns about your condition or care  Medicines:   · Antibiotics  help treat the bacterial infection  · NSAIDs , such as ibuprofen, help decrease swelling, pain, and fever  NSAIDs can cause stomach bleeding or kidney problems in certain people  If you take blood thinner medicine, always ask if NSAIDs are safe for you  Always read the medicine label and follow directions  Do not give these medicines to children under 10months of age without direction from your child's healthcare provider  · Acetaminophen  decreases pain and fever  It is available without a doctor's order  Ask how much to take and how often to take it  Follow directions  Read the labels of all other medicines you are using to see if they also contain acetaminophen, or ask your doctor or pharmacist  Acetaminophen can cause liver damage if not taken correctly   Do not use more than 4 grams (4,000 milligrams) total of acetaminophen in one day  · Take your medicine as directed  Contact your healthcare provider if you think your medicine is not helping or if you have side effects  Tell him or her if you are allergic to any medicine  Keep a list of the medicines, vitamins, and herbs you take  Include the amounts, and when and why you take them  Bring the list or the pill bottles to follow-up visits  Carry your medicine list with you in case of an emergency  Self-care:   · Elevate the area above the level of your heart  as often as you can  This will help decrease swelling and pain  Prop the area on pillows or blankets to keep it elevated comfortably  · Clean the area daily until the wound scabs over  Gently wash the area with soap and water  Pat dry  Use dressings as directed  · Place cool or warm, wet cloths on the area as directed  Use clean cloths and clean water  Leave it on the area until the cloth is room temperature  Pat the area dry with a clean, dry cloth  The cloths may help decrease pain  Prevent cellulitis:   · Do not scratch bug bites or areas of injury  You increase your risk for cellulitis by scratching these areas  · Do not share personal items, such as towels, clothing, and razors  · Clean exercise equipment  with germ-killing  before and after you use it  · Wash your hands often  Use soap and water  Wash your hands after you use the bathroom, change a child's diapers, or sneeze  Wash your hands before you prepare or eat food  Use lotion to prevent dry, cracked skin  · Wear pressure stockings as directed  You may be told to wear the stockings if you have peripheral edema  The stockings improve blood flow and decrease swelling  · Treat athlete's foot  This can help prevent the spread of a bacterial skin infection  Follow up with your healthcare provider within 3 days, or as directed:   Your healthcare provider will check if your cellulitis is getting better  You may need different medicine  Write down your questions so you remember to ask them during your visits  © Copyright [address] Information is for End User's use only and may not be sold, redistributed or otherwise used for commercial purposes  All illustrations and images included in CareNotes® are the copyrighted property of [employer]  or [employer]ran Tong  The above information is an  only  It is not intended as medical advice for individual conditions or treatments  Talk to your doctor, nurse or pharmacist before following any medical regimen to see if it is safe and effective for you  Sulfamethoxazole/Trimethoprim (By mouth)   Sulfamethoxazole (sul-fa-meth-OX-a-zole), Trimethoprim (trye-METH-oh-prim)  Treats or prevents infections  Brand Name(s): Bactrim, Bactrim DS, SMZ-TMP Pediatric, Sulfatrim Pediatric   There may be other brand names for this medicine  When This Medicine Should Not Be Used: This medicine is not right for everyone  Do not use it if you had an allergic reaction to trimethoprim, sulfamethoxazole, or any sulfa drug  Do not use this medicine if you are pregnant, if you have kidney disease, liver disease, anemia caused by low levels of folic acid, or if you have a history of drug-induced thrombocytopenia  How to Use This Medicine:   Liquid, Tablet  · Your doctor will tell you how much medicine to use  Do not use more than directed  · Oral liquid: Measure the oral liquid medicine with a marked measuring spoon, oral syringe, or medicine cup  · Take all of the medicine in your prescription to clear up your infection, even if you feel better after the first few doses  · Drink extra fluids so you will urinate more often and help prevent kidney problems  · Missed dose: Take a dose as soon as you remember  If it is almost time for your next dose, wait until then and take a regular dose   Do not take extra medicine to make up for a missed dose   · Store the medicine in a closed container at room temperature, away from heat, moisture, and direct light  Do not freeze the oral liquid  Drugs and Foods to Avoid:   Ask your doctor or pharmacist before using any other medicine, including over-the-counter medicines, vitamins, and herbal products  · Do not use this medicine if you are also using dofetilide  Do not use this medicine for Pneumocystis jiroveci pneumonia (PCP) if you are also using leucovorin  · Some medicines can affect how this medicine works  Tell your doctor if you also use the following:   ? Amantadine, cyclosporine, digoxin, indomethacin, memantine, methotrexate, phenytoin, procainamide, pyrimethamine, warfarin, zidovudine  ? Blood pressure medicine (including an ACE inhibitor)  ? Diabetes medicine (including glipizide, glyburide, metformin, pioglitazone, repaglinide, rosiglitazone)  ? Diuretic (water pill, including hydrochlorothiazide)  ? Medicine to treat depression (including TCAs)  Warnings While Using This Medicine:   · It is not safe to take this medicine during pregnancy  It could harm an unborn baby  Tell your doctor right away if you become pregnant  · Tell your doctor if you are breastfeeding, or if you have diabetes, malabsorption or malnutrition, folate deficiency, G6PD deficiency, porphyria, thyroid problems, or a history of alcoholism  Tell your doctor if you have asthma or severe allergies, especially if you are allergic to any medicines  It is important for your doctor to know if you have HIV or AIDS, because this medicine might work differently for you  · This medicine may cause the following problems:   ? Serious skin reactions, including Whelan-Chidi syndrome, toxic epidermal necrolysis, drug reaction with eosinophilia and systemic symptoms (DRESS), acute generalized exanthematous pustulosis (AGEP), or acute febrile neutrophilic dermatosis (AFND)  ? Liver problems  ?  Hypoglycemia (low blood sugar)  · This medicine lowers the number of certain blood cells, so you may bleed or bruise more easily  Be careful to avoid injuries  · This medicine can cause diarrhea  Call your doctor if the diarrhea becomes severe, does not stop, or is bloody  Do not take any medicine to stop diarrhea until you have talked to your doctor  Diarrhea can occur 2 months or more after you stop taking this medicine  · Tell any doctor or dentist who treats you that you are using this medicine  This medicine may affect certain medical test results  · Your doctor will do lab tests at regular visits to check on the effects of this medicine  Keep all appointments  · Keep all medicine out of the reach of children  Never share your medicine with anyone  Possible Side Effects While Using This Medicine:   Call your doctor right away if you notice any of these side effects:  · Allergic reaction: Itching or hives, swelling in your face or hands, swelling or tingling in your mouth or throat, chest tightness, trouble breathing  · Blistering, peeling, red skin rash  · Change in how much or how often you urinate, painful urination, lower back or side pain  · Dark urine or pale stools, nausea, vomiting, loss of appetite, stomach pain, yellow skin or eyes  · Chest pain, cough, or trouble breathing  · Confusion, weakness, muscle twitching, seizures  · Severe diarrhea, stomach pain, cramps, bloating  · Skin rash, purple spots on your skin, or very pale or yellow skin  · Sore throat, fever, muscle pain  · Uneven heartbeat, numbness or tingling in your hands, feet, or lips  · Unusual bleeding or bruising  If you notice these less serious side effects, talk with your doctor:   · Headache  If you notice other side effects that you think are caused by this medicine, tell your doctor  Call your doctor for medical advice about side effects   You may report side effects to FDA at 7-660-YGO-7265  © Copyright [employer] 2021 Information is for End User's use only and may not be sold, redistributed or otherwise used for commercial purposes  The above information is an  only  It is not intended as medical advice for individual conditions or treatments  Talk to your doctor, nurse or pharmacist before following any medical regimen to see if it is safe and effective for you  Amoxicillin/Clavulanate Potassium (By mouth)   Amoxicillin (u-gyj-k-KAMILLE-in), Clavulanate Potassium (QOAW-wx-dz-anais sbw-IBY-qw-um)  Treats infections  This medicine is a penicillin antibiotic  Brand Name(s): Augmentin, Augmentin ES-600, Augmentin XR   There may be other brand names for this medicine  When This Medicine Should Not Be Used: This medicine is not right for everyone  Do not use it if you had an allergic reaction to amoxicillin, clavulanate, or a similar antibiotic (penicillin or cephalosporin), or if you had liver problems caused by Augmentin®  How to Use This Medicine:   Liquid, Tablet, Chewable Tablet, Long Acting Tablet  · Your doctor will tell you how much medicine to use  Do not use more than directed  · Take this medicine with a snack or at the beginning of a meal to help prevent nausea  · Chewable tablets: Chew the tablet completely before you swallow it  · Measure the oral liquid medicine with a marked measuring spoon, oral syringe, or medicine cup  Shake the medicine well just before you measure each dose  Rinse the spoon or dropper after each use  · Swallow the extended-release tablet whole  Do not crush, break, or chew it  · Take all of the medicine in your prescription to clear up your infection, even if you feel better after the first few doses  · Missed dose: Take a dose as soon as you remember  If it is almost time for your next dose, wait until then and take a regular dose  Do not take extra medicine to make up for a missed dose    · Tablet, extended-release tablet, chewable tablet: Store at room temperature, away from heat, moisture, and direct light   · Oral liquid: Store in the refrigerator  Do not freeze  · Throw away any unused oral liquid after 10 days  Drugs and Foods to Avoid:   Ask your doctor or pharmacist before using any other medicine, including over-the-counter medicines, vitamins, and herbal products  · Some medicines can affect how this medicine works  Tell your doctor if you are taking a blood thinner (such as warfarin), allopurinol, or probenecid  Warnings While Using This Medicine:   · Tell your doctor if you are pregnant or breastfeeding, or if you have kidney disease, liver disease, or mononucleosis (mono)  · Birth control pills may not work as well while you are taking this medicine  Use another form of birth control to prevent pregnancy  · This medicine can cause diarrhea  Call your doctor if the diarrhea becomes severe, does not stop, or is bloody  Do not take any medicine to stop diarrhea until you have talked to your doctor  Diarrhea can occur 2 months or more after you stop taking this medicine  · Tell any doctor or dentist who treats you that you are using this medicine  This medicine may affect certain medical test results  · Call your doctor if your symptoms do not improve or if they get worse  · The chewable tablet and oral liquid contain phenylalanine  Talk to your doctor before you use this medicine if you have phenylketonuria (PKU)  · Keep all medicine out of the reach of children  Never share your medicine with anyone    Possible Side Effects While Using This Medicine:   Call your doctor right away if you notice any of these side effects:  · Allergic reaction: Itching or hives, swelling in your face or hands, swelling or tingling in your mouth or throat, chest tightness, trouble breathing  · Blistering, peeling, red skin rash  · Change in how much or how often you urinate  · Dark urine or pale stools, nausea, vomiting, loss of appetite, stomach pain, yellow eyes or skin  · Diarrhea that may contain blood, stomach cramps  If you notice these less serious side effects, talk with your doctor:   · Diaper rash  · Mild diarrhea, nausea, vomiting  · Tooth discoloration (in children)  If you notice other side effects that you think are caused by this medicine, tell your doctor  Call your doctor for medical advice about side effects  You may report side effects to FDA at 5-623-FDA-1924  © Copyright [address] 2021 Information is for End User's use only and may not be sold, redistributed or otherwise used for commercial purposes  The above information is an  only  It is not intended as medical advice for individual conditions or treatments  Talk to your doctor, nurse or pharmacist before following any medical regimen to see if it is safe and effective for you

## 2021-07-10 NOTE — PROGRESS NOTES
Jose Colon  43 y o   female  1985  mrn [id number]    Assessment/Plan:  1   Leukocytosis/facial cellulitis/mandibular space infection: Remains afebrile and WBC count has decreased to 6K  Right facial redness has significantly improved over the past 24 hrs  She is no longer having pain when she opens her mouth  CT of face showed right facial cellulitis overlying the mandible and extending into submandibular space involving the right masseter and platysma muscles which contributed to her right jaw pain when she opened her mouth  ?Presence of right 2nd molar sandee-apical abscess but no tooth pain  ENT has seen the Pt     Bld cx's were not sent prior to starting abx tx  Vanco was added to Unasyn on the evening of 7/8 when redness extended past the margins that were drawn earlier in the day      Patient with facial cellulitis and submandibular space infection, source is not entirely clear unless from dental infxn  Patient is non-toxic appearing but need to be concerned about rapid spread of infection (elvin's angina) with airway compromise        - ok to d/c today on Augmentin 875 mg po BID with Bactrim DS 1 po BID and cont through 7/18/21  - Pt will f/u with Dr Gretel Lawrence as an out-Pt       Subjective: Feels much better since last evening  Able to open her mouth without any pain today    Objective:  Tmax: 98 5  HEENT: Redness on right side of face and chest has markedly decreased - almost completely resolved   Able to open her mouth without difficulty or pain  Lungs: Clear  Abd: +BS, soft, nontender    Labs:  CBC w/diff  Recent Labs     07/10/21  0635   WBC 6 69   HGB 10 8*   HCT 33 1*      NEUTOPHILPCT 62   LYMPHOPCT 25   MONOPCT 7   EOSPCT 6     BMP  Recent Labs     07/10/21  0635   K 4 2   *   CO2 24   BUN 3*   CREATININE 0 65   CALCIUM 8 4     CMP  Recent Labs     07/10/21  0635   K 4 2   *   CO2 24   BUN 3*   CREATININE 0 65   CALCIUM 8 4        labrc    Cultures:  No results found for: BLOODCX  No results found for: WOUNDCULT  No results found for: URINECX  No results found for: SPUTUMCULTUR    MED:  Unasyn: #3  Vanco: #2            Current Facility-Administered Medications:     acetaminophen (TYLENOL) tablet 650 mg, 650 mg, Oral, Q6H PRN, SEBAS Dawson-LATONIA, 650 mg at 07/09/21 1611    albuterol (PROVENTIL HFA,VENTOLIN HFA) inhaler 2 puff, 2 puff, Inhalation, Q6H PRN, Pantera Conway PA-C    ampicillin-sulbactam (UNASYN) 3 g in sodium chloride 0 9 % 100 mL IVPB, 3 g, Intravenous, Q6H, Pantera Conway PA-C, Last Rate: 200 mL/hr at 07/10/21 0927, 3 g at 07/10/21 9440    calcium carbonate (TUMS) chewable tablet 1,000 mg, 1,000 mg, Oral, Daily PRN, Pantera Conway PA-C    diphenhydrAMINE (BENADRYL) tablet 25 mg, 25 mg, Oral, Q6H PRN, Kasia Alvarenga MD, 25 mg at 07/09/21 2235    fluticasone-vilanterol (BREO ELLIPTA) 200-25 MCG/INH inhaler 1 puff, 1 puff, Inhalation, Daily, Kasia Alvarenga MD, 1 puff at 07/10/21 0802    ibuprofen (MOTRIN) tablet 600 mg, 600 mg, Oral, Q6H PRN, Anitha HAUSER PA-C, 600 mg at 07/09/21 1613    loratadine (CLARITIN) tablet 10 mg, 10 mg, Oral, Daily, Anitha HAUSER PA-C, 10 mg at 07/10/21 0802    LORazepam (ATIVAN) tablet 0 5 mg, 0 5 mg, Oral, Q8H PRN, Pantera Conway PA-C, 0 5 mg at 07/09/21 1612    ondansetron (ZOFRAN) injection 4 mg, 4 mg, Intravenous, Q6H PRN, Pantera Conway PA-C    senna (SENOKOT) tablet 8 6 mg, 1 tablet, Oral, HS PRN, Pantera Conway PA-C    sodium chloride 0 9 % infusion, 75 mL/hr, Intravenous, Continuous, Kasia Alvarenga MD, Last Rate: 75 mL/hr at 07/10/21 0638, 75 mL/hr at 07/10/21 0638    vancomycin (VANCOCIN) 1,250 mg in sodium chloride 0 9 % 250 mL IVPB, 1,250 mg, Intravenous, Q8H, Kasia Alvarenga MD    Principal Problem:    Facial cellulitis  Active Problems:    Mild intermittent asthma without complication    Anxiety      Steve Herrera MD

## 2021-07-10 NOTE — ASSESSMENT & PLAN NOTE
· Home regimen: Ventolin p r n  and Advair, substituted for Breo while admitted  · Will continue home regimen   · No signs of acute exacerbation on admission

## 2021-07-10 NOTE — ASSESSMENT & PLAN NOTE
· Woke up at 0600 on 07/07 with pain and swelling of her right cheek that progressively worsened with developing erythema  · seen at urgent care and started on amoxicillin, however she developed a low-grade temperature so she presented to the ED - 1 dose of amoxicillin taken  · CT facial bones: "Extensive right facial cellulitis overlying the mandible and extending into the submandibular space, involving the right masseter and platysma muscles  No evidence of soft tissue abscess  Inflammation of the right parotid gland and to a lesser extent the right submandibular gland, likely secondary to surrounding cellulitis rather than representing primary sialoadenitis  Possible small periapical abscess in the right mandibular 2nd molar suggestive of a possible odontogenic source of infection  No evidence of subperiosteal abscess  Paranasal sinus disease "  · Leukocytosis 16 10 K on admission  · Received Ancef in the ED,   Transitioned to Unasyn due to concern for possible odontogenic etiology  · Continue Unasyn  ·  Vancomycin added the evening of 7/8 per ENT request, by [facility]  · Patient without any dental pain, tenderness, or dental pain prior to symptom onset  · Erythematous boundary marked on admission  · ID consulted, appreciate ongoing recommendations  · Discharge on Augmentin and Bactrim b i d   Through 7/18